# Patient Record
Sex: FEMALE | Race: WHITE | NOT HISPANIC OR LATINO | Employment: OTHER | URBAN - METROPOLITAN AREA
[De-identification: names, ages, dates, MRNs, and addresses within clinical notes are randomized per-mention and may not be internally consistent; named-entity substitution may affect disease eponyms.]

---

## 2017-01-12 ENCOUNTER — ALLSCRIPTS OFFICE VISIT (OUTPATIENT)
Dept: OTHER | Facility: OTHER | Age: 62
End: 2017-01-12

## 2017-01-12 DIAGNOSIS — M54.2 CERVICALGIA: ICD-10-CM

## 2017-01-12 DIAGNOSIS — I10 ESSENTIAL (PRIMARY) HYPERTENSION: ICD-10-CM

## 2017-01-12 DIAGNOSIS — M06.09 RHEUMATOID ARTHRITIS OF MULTIPLE SITES WITHOUT RHEUMATOID FACTOR (HCC): ICD-10-CM

## 2017-01-12 DIAGNOSIS — M47.812 SPONDYLOSIS OF CERVICAL REGION WITHOUT MYELOPATHY OR RADICULOPATHY: ICD-10-CM

## 2017-01-12 DIAGNOSIS — F41.9 ANXIETY DISORDER: ICD-10-CM

## 2017-01-12 DIAGNOSIS — Z51.81 ENCOUNTER FOR THERAPEUTIC DRUG LEVEL MONITORING: ICD-10-CM

## 2017-03-01 ENCOUNTER — TRANSCRIBE ORDERS (OUTPATIENT)
Dept: LAB | Facility: CLINIC | Age: 62
End: 2017-03-01

## 2017-03-01 ENCOUNTER — APPOINTMENT (OUTPATIENT)
Dept: LAB | Facility: CLINIC | Age: 62
End: 2017-03-01
Payer: MEDICARE

## 2017-03-01 DIAGNOSIS — I10 ESSENTIAL (PRIMARY) HYPERTENSION: ICD-10-CM

## 2017-03-01 DIAGNOSIS — R73.01 IMPAIRED FASTING GLUCOSE: Primary | ICD-10-CM

## 2017-03-01 DIAGNOSIS — Z51.81 ENCOUNTER FOR THERAPEUTIC DRUG LEVEL MONITORING: ICD-10-CM

## 2017-03-01 DIAGNOSIS — R73.01 IMPAIRED FASTING GLUCOSE: ICD-10-CM

## 2017-03-01 DIAGNOSIS — D69.6 THROMBOCYTOPENIA, UNSPECIFIED (HCC): ICD-10-CM

## 2017-03-01 DIAGNOSIS — M54.2 CERVICALGIA: ICD-10-CM

## 2017-03-01 DIAGNOSIS — M06.09 RHEUMATOID ARTHRITIS OF MULTIPLE SITES WITHOUT RHEUMATOID FACTOR (HCC): ICD-10-CM

## 2017-03-01 DIAGNOSIS — M47.812 SPONDYLOSIS OF CERVICAL REGION WITHOUT MYELOPATHY OR RADICULOPATHY: ICD-10-CM

## 2017-03-01 DIAGNOSIS — F41.9 ANXIETY DISORDER: ICD-10-CM

## 2017-03-01 LAB
ALBUMIN SERPL BCP-MCNC: 3.9 G/DL (ref 3.5–5)
ALP SERPL-CCNC: 74 U/L (ref 46–116)
ALT SERPL W P-5'-P-CCNC: 55 U/L (ref 12–78)
ANION GAP SERPL CALCULATED.3IONS-SCNC: 8 MMOL/L (ref 4–13)
AST SERPL W P-5'-P-CCNC: 36 U/L (ref 5–45)
BASOPHILS # BLD AUTO: 0.02 THOUSANDS/ΜL (ref 0–0.1)
BASOPHILS NFR BLD AUTO: 1 % (ref 0–1)
BILIRUB SERPL-MCNC: 0.7 MG/DL (ref 0.2–1)
BUN SERPL-MCNC: 10 MG/DL (ref 5–25)
CALCIUM SERPL-MCNC: 8.6 MG/DL (ref 8.3–10.1)
CHLORIDE SERPL-SCNC: 106 MMOL/L (ref 100–108)
CO2 SERPL-SCNC: 29 MMOL/L (ref 21–32)
CREAT SERPL-MCNC: 0.69 MG/DL (ref 0.6–1.3)
CRP SERPL QL: <3 MG/L
EOSINOPHIL # BLD AUTO: 0.06 THOUSAND/ΜL (ref 0–0.61)
EOSINOPHIL NFR BLD AUTO: 2 % (ref 0–6)
ERYTHROCYTE [DISTWIDTH] IN BLOOD BY AUTOMATED COUNT: 15.6 % (ref 11.6–15.1)
ERYTHROCYTE [SEDIMENTATION RATE] IN BLOOD: 2 MM/HOUR (ref 0–20)
EST. AVERAGE GLUCOSE BLD GHB EST-MCNC: 126 MG/DL
GFR SERPL CREATININE-BSD FRML MDRD: >60 ML/MIN/1.73SQ M
GLUCOSE SERPL-MCNC: 103 MG/DL (ref 65–140)
HBA1C MFR BLD: 6 % (ref 4.2–6.3)
HCT VFR BLD AUTO: 36.3 % (ref 34.8–46.1)
HGB BLD-MCNC: 11.8 G/DL (ref 11.5–15.4)
LYMPHOCYTES # BLD AUTO: 1.53 THOUSANDS/ΜL (ref 0.6–4.47)
LYMPHOCYTES NFR BLD AUTO: 39 % (ref 14–44)
MCH RBC QN AUTO: 21.1 PG (ref 26.8–34.3)
MCHC RBC AUTO-ENTMCNC: 32.5 G/DL (ref 31.4–37.4)
MCV RBC AUTO: 65 FL (ref 82–98)
MONOCYTES # BLD AUTO: 0.41 THOUSAND/ΜL (ref 0.17–1.22)
MONOCYTES NFR BLD AUTO: 11 % (ref 4–12)
NEUTROPHILS # BLD AUTO: 1.9 THOUSANDS/ΜL (ref 1.85–7.62)
NEUTS SEG NFR BLD AUTO: 47 % (ref 43–75)
PLATELET # BLD AUTO: 85 THOUSANDS/UL (ref 149–390)
POTASSIUM SERPL-SCNC: 3.3 MMOL/L (ref 3.5–5.3)
PROT SERPL-MCNC: 6.8 G/DL (ref 6.4–8.2)
RBC # BLD AUTO: 5.58 MILLION/UL (ref 3.81–5.12)
SODIUM SERPL-SCNC: 143 MMOL/L (ref 136–145)
WBC # BLD AUTO: 3.92 THOUSAND/UL (ref 4.31–10.16)

## 2017-03-01 PROCEDURE — 85025 COMPLETE CBC W/AUTO DIFF WBC: CPT

## 2017-03-01 PROCEDURE — 83036 HEMOGLOBIN GLYCOSYLATED A1C: CPT

## 2017-03-01 PROCEDURE — 85652 RBC SED RATE AUTOMATED: CPT

## 2017-03-01 PROCEDURE — 80053 COMPREHEN METABOLIC PANEL: CPT

## 2017-03-01 PROCEDURE — 86140 C-REACTIVE PROTEIN: CPT

## 2017-03-01 PROCEDURE — 36415 COLL VENOUS BLD VENIPUNCTURE: CPT

## 2017-03-02 ENCOUNTER — GENERIC CONVERSION - ENCOUNTER (OUTPATIENT)
Dept: OTHER | Facility: OTHER | Age: 62
End: 2017-03-02

## 2017-03-06 ENCOUNTER — ANESTHESIA EVENT (OUTPATIENT)
Dept: GASTROENTEROLOGY | Facility: HOSPITAL | Age: 62
End: 2017-03-06
Payer: MEDICARE

## 2017-03-06 ENCOUNTER — GENERIC CONVERSION - ENCOUNTER (OUTPATIENT)
Dept: OTHER | Facility: OTHER | Age: 62
End: 2017-03-06

## 2017-03-06 ENCOUNTER — ANESTHESIA (OUTPATIENT)
Dept: GASTROENTEROLOGY | Facility: HOSPITAL | Age: 62
End: 2017-03-06
Payer: MEDICARE

## 2017-03-06 ENCOUNTER — HOSPITAL ENCOUNTER (OUTPATIENT)
Facility: HOSPITAL | Age: 62
Setting detail: OUTPATIENT SURGERY
Discharge: HOME | End: 2017-03-06
Attending: INTERNAL MEDICINE | Admitting: INTERNAL MEDICINE
Payer: MEDICARE

## 2017-03-06 VITALS
WEIGHT: 154.32 LBS | HEIGHT: 61 IN | OXYGEN SATURATION: 98 % | HEART RATE: 54 BPM | DIASTOLIC BLOOD PRESSURE: 58 MMHG | BODY MASS INDEX: 29.14 KG/M2 | RESPIRATION RATE: 16 BRPM | TEMPERATURE: 97 F | SYSTOLIC BLOOD PRESSURE: 132 MMHG

## 2017-03-06 DIAGNOSIS — D12.6 TUBULOVILLOUS ADENOMA POLYP OF COLON: ICD-10-CM

## 2017-03-06 PROCEDURE — 88305 TISSUE EXAM BY PATHOLOGIST: CPT | Performed by: INTERNAL MEDICINE

## 2017-03-06 RX ORDER — SODIUM CHLORIDE, SODIUM LACTATE, POTASSIUM CHLORIDE, CALCIUM CHLORIDE 600; 310; 30; 20 MG/100ML; MG/100ML; MG/100ML; MG/100ML
125 INJECTION, SOLUTION INTRAVENOUS CONTINUOUS
Status: DISCONTINUED | OUTPATIENT
Start: 2017-03-06 | End: 2017-03-06 | Stop reason: HOSPADM

## 2017-03-06 RX ORDER — PROPOFOL 10 MG/ML
INJECTION, EMULSION INTRAVENOUS CONTINUOUS PRN
Status: DISCONTINUED | OUTPATIENT
Start: 2017-03-06 | End: 2017-03-06 | Stop reason: SURG

## 2017-03-06 RX ORDER — ONDANSETRON 2 MG/ML
4 INJECTION INTRAMUSCULAR; INTRAVENOUS ONCE
Status: DISCONTINUED | OUTPATIENT
Start: 2017-03-06 | End: 2017-03-06 | Stop reason: HOSPADM

## 2017-03-06 RX ORDER — PROPOFOL 10 MG/ML
INJECTION, EMULSION INTRAVENOUS AS NEEDED
Status: DISCONTINUED | OUTPATIENT
Start: 2017-03-06 | End: 2017-03-06 | Stop reason: SURG

## 2017-03-06 RX ADMIN — SODIUM CHLORIDE, SODIUM LACTATE, POTASSIUM CHLORIDE, AND CALCIUM CHLORIDE 125 ML/HR: .6; .31; .03; .02 INJECTION, SOLUTION INTRAVENOUS at 09:37

## 2017-03-06 RX ADMIN — PROPOFOL 150 MG: 10 INJECTION, EMULSION INTRAVENOUS at 10:07

## 2017-03-06 RX ADMIN — PROPOFOL 100 MCG/KG/MIN: 10 INJECTION, EMULSION INTRAVENOUS at 10:08

## 2017-03-14 ENCOUNTER — GENERIC CONVERSION - ENCOUNTER (OUTPATIENT)
Dept: OTHER | Facility: OTHER | Age: 62
End: 2017-03-14

## 2017-03-28 ENCOUNTER — GENERIC CONVERSION - ENCOUNTER (OUTPATIENT)
Dept: OTHER | Facility: OTHER | Age: 62
End: 2017-03-28

## 2017-05-03 ENCOUNTER — TRANSCRIBE ORDERS (OUTPATIENT)
Dept: ADMINISTRATIVE | Facility: HOSPITAL | Age: 62
End: 2017-05-03

## 2017-05-03 DIAGNOSIS — Z12.31 VISIT FOR SCREENING MAMMOGRAM: Primary | ICD-10-CM

## 2017-05-18 ENCOUNTER — APPOINTMENT (OUTPATIENT)
Dept: LAB | Facility: CLINIC | Age: 62
End: 2017-05-18
Payer: MEDICARE

## 2017-05-18 ENCOUNTER — ALLSCRIPTS OFFICE VISIT (OUTPATIENT)
Dept: OTHER | Facility: OTHER | Age: 62
End: 2017-05-18

## 2017-05-18 DIAGNOSIS — M06.09 RHEUMATOID ARTHRITIS OF MULTIPLE SITES WITHOUT RHEUMATOID FACTOR (HCC): ICD-10-CM

## 2017-05-18 LAB
ALBUMIN SERPL BCP-MCNC: 3.7 G/DL (ref 3.5–5)
ALP SERPL-CCNC: 81 U/L (ref 46–116)
ALT SERPL W P-5'-P-CCNC: 57 U/L (ref 12–78)
ANION GAP SERPL CALCULATED.3IONS-SCNC: 6 MMOL/L (ref 4–13)
AST SERPL W P-5'-P-CCNC: 32 U/L (ref 5–45)
BASOPHILS # BLD AUTO: 0.01 THOUSANDS/ΜL (ref 0–0.1)
BASOPHILS NFR BLD AUTO: 0 % (ref 0–1)
BILIRUB SERPL-MCNC: 0.7 MG/DL (ref 0.2–1)
BUN SERPL-MCNC: 10 MG/DL (ref 5–25)
CALCIUM SERPL-MCNC: 8.9 MG/DL (ref 8.3–10.1)
CHLORIDE SERPL-SCNC: 106 MMOL/L (ref 100–108)
CO2 SERPL-SCNC: 30 MMOL/L (ref 21–32)
CREAT SERPL-MCNC: 0.72 MG/DL (ref 0.6–1.3)
CRP SERPL QL: <3 MG/L
EOSINOPHIL # BLD AUTO: 0.05 THOUSAND/ΜL (ref 0–0.61)
EOSINOPHIL NFR BLD AUTO: 1 % (ref 0–6)
ERYTHROCYTE [DISTWIDTH] IN BLOOD BY AUTOMATED COUNT: 16.6 % (ref 11.6–15.1)
ERYTHROCYTE [SEDIMENTATION RATE] IN BLOOD: 3 MM/HOUR (ref 0–20)
GFR SERPL CREATININE-BSD FRML MDRD: >60 ML/MIN/1.73SQ M
GLUCOSE SERPL-MCNC: 123 MG/DL (ref 65–140)
HCT VFR BLD AUTO: 38 % (ref 34.8–46.1)
HGB BLD-MCNC: 12.4 G/DL (ref 11.5–15.4)
LYMPHOCYTES # BLD AUTO: 1.5 THOUSANDS/ΜL (ref 0.6–4.47)
LYMPHOCYTES NFR BLD AUTO: 26 % (ref 14–44)
MCH RBC QN AUTO: 21.1 PG (ref 26.8–34.3)
MCHC RBC AUTO-ENTMCNC: 32.6 G/DL (ref 31.4–37.4)
MCV RBC AUTO: 65 FL (ref 82–98)
MONOCYTES # BLD AUTO: 0.6 THOUSAND/ΜL (ref 0.17–1.22)
MONOCYTES NFR BLD AUTO: 11 % (ref 4–12)
NEUTROPHILS # BLD AUTO: 3.55 THOUSANDS/ΜL (ref 1.85–7.62)
NEUTS SEG NFR BLD AUTO: 62 % (ref 43–75)
PLATELET # BLD AUTO: 117 THOUSANDS/UL (ref 149–390)
POTASSIUM SERPL-SCNC: 3.7 MMOL/L (ref 3.5–5.3)
PROT SERPL-MCNC: 6.8 G/DL (ref 6.4–8.2)
RBC # BLD AUTO: 5.87 MILLION/UL (ref 3.81–5.12)
SODIUM SERPL-SCNC: 142 MMOL/L (ref 136–145)
WBC # BLD AUTO: 5.71 THOUSAND/UL (ref 4.31–10.16)

## 2017-05-18 PROCEDURE — 85025 COMPLETE CBC W/AUTO DIFF WBC: CPT

## 2017-05-18 PROCEDURE — 80053 COMPREHEN METABOLIC PANEL: CPT

## 2017-05-18 PROCEDURE — 36415 COLL VENOUS BLD VENIPUNCTURE: CPT

## 2017-05-18 PROCEDURE — 86140 C-REACTIVE PROTEIN: CPT

## 2017-05-18 PROCEDURE — 85652 RBC SED RATE AUTOMATED: CPT

## 2017-05-26 ENCOUNTER — HOSPITAL ENCOUNTER (OUTPATIENT)
Dept: MAMMOGRAPHY | Facility: HOSPITAL | Age: 62
Discharge: HOME | End: 2017-05-26
Payer: MEDICARE

## 2017-05-26 DIAGNOSIS — Z12.31 VISIT FOR SCREENING MAMMOGRAM: ICD-10-CM

## 2017-05-26 PROCEDURE — G0202 SCR MAMMO BI INCL CAD: HCPCS

## 2017-07-19 ENCOUNTER — APPOINTMENT (OUTPATIENT)
Dept: LAB | Facility: CLINIC | Age: 62
End: 2017-07-19
Payer: MEDICARE

## 2017-07-19 ENCOUNTER — TRANSCRIBE ORDERS (OUTPATIENT)
Dept: LAB | Facility: CLINIC | Age: 62
End: 2017-07-19

## 2017-07-19 ENCOUNTER — ALLSCRIPTS OFFICE VISIT (OUTPATIENT)
Dept: OTHER | Facility: OTHER | Age: 62
End: 2017-07-19

## 2017-07-19 DIAGNOSIS — M06.09 RHEUMATOID ARTHRITIS OF MULTIPLE SITES WITHOUT RHEUMATOID FACTOR (HCC): ICD-10-CM

## 2017-07-19 PROCEDURE — 86480 TB TEST CELL IMMUN MEASURE: CPT

## 2017-07-19 PROCEDURE — 36415 COLL VENOUS BLD VENIPUNCTURE: CPT

## 2017-07-22 LAB
ANNOTATION COMMENT IMP: NORMAL
GAMMA INTERFERON BACKGROUND BLD IA-ACNC: 0.05 IU/ML
M TB IFN-G BLD-IMP: NEGATIVE
M TB IFN-G CD4+ BCKGRND COR BLD-ACNC: 0.02 IU/ML
M TB IFN-G CD4+ T-CELLS BLD-ACNC: 0.07 IU/ML
MITOGEN IGNF BLD-ACNC: 5.34 IU/ML
QUANTIFERON-TB GOLD IN TUBE: NORMAL
SERVICE CMNT-IMP: NORMAL

## 2017-09-05 DIAGNOSIS — M06.09 RHEUMATOID ARTHRITIS OF MULTIPLE SITES WITHOUT RHEUMATOID FACTOR (HCC): ICD-10-CM

## 2017-09-07 ENCOUNTER — APPOINTMENT (OUTPATIENT)
Dept: LAB | Facility: CLINIC | Age: 62
End: 2017-09-07
Payer: MEDICARE

## 2017-09-07 DIAGNOSIS — M06.09 RHEUMATOID ARTHRITIS OF MULTIPLE SITES WITHOUT RHEUMATOID FACTOR (HCC): ICD-10-CM

## 2017-09-07 LAB
ALBUMIN SERPL BCP-MCNC: 3.7 G/DL (ref 3.5–5)
ALP SERPL-CCNC: 68 U/L (ref 46–116)
ALT SERPL W P-5'-P-CCNC: 52 U/L (ref 12–78)
ANION GAP SERPL CALCULATED.3IONS-SCNC: 5 MMOL/L (ref 4–13)
AST SERPL W P-5'-P-CCNC: 34 U/L (ref 5–45)
BASOPHILS # BLD AUTO: 0.01 THOUSANDS/ΜL (ref 0–0.1)
BASOPHILS NFR BLD AUTO: 0 % (ref 0–1)
BILIRUB SERPL-MCNC: 0.8 MG/DL (ref 0.2–1)
BUN SERPL-MCNC: 20 MG/DL (ref 5–25)
CALCIUM SERPL-MCNC: 8.8 MG/DL (ref 8.3–10.1)
CHLORIDE SERPL-SCNC: 106 MMOL/L (ref 100–108)
CO2 SERPL-SCNC: 32 MMOL/L (ref 21–32)
CREAT SERPL-MCNC: 0.8 MG/DL (ref 0.6–1.3)
CRP SERPL QL: <3 MG/L
EOSINOPHIL # BLD AUTO: 0.06 THOUSAND/ΜL (ref 0–0.61)
EOSINOPHIL NFR BLD AUTO: 1 % (ref 0–6)
ERYTHROCYTE [DISTWIDTH] IN BLOOD BY AUTOMATED COUNT: 16.5 % (ref 11.6–15.1)
ERYTHROCYTE [SEDIMENTATION RATE] IN BLOOD: 3 MM/HOUR (ref 0–20)
GFR SERPL CREATININE-BSD FRML MDRD: 80 ML/MIN/1.73SQ M
GLUCOSE SERPL-MCNC: 67 MG/DL (ref 65–140)
HCT VFR BLD AUTO: 35.1 % (ref 34.8–46.1)
HGB BLD-MCNC: 11.5 G/DL (ref 11.5–15.4)
LYMPHOCYTES # BLD AUTO: 1.64 THOUSANDS/ΜL (ref 0.6–4.47)
LYMPHOCYTES NFR BLD AUTO: 26 % (ref 14–44)
MCH RBC QN AUTO: 20.8 PG (ref 26.8–34.3)
MCHC RBC AUTO-ENTMCNC: 32.8 G/DL (ref 31.4–37.4)
MCV RBC AUTO: 64 FL (ref 82–98)
MONOCYTES # BLD AUTO: 0.59 THOUSAND/ΜL (ref 0.17–1.22)
MONOCYTES NFR BLD AUTO: 9 % (ref 4–12)
NEUTROPHILS # BLD AUTO: 4.02 THOUSANDS/ΜL (ref 1.85–7.62)
NEUTS SEG NFR BLD AUTO: 64 % (ref 43–75)
PLATELET # BLD AUTO: 114 THOUSANDS/UL (ref 149–390)
POTASSIUM SERPL-SCNC: 3.9 MMOL/L (ref 3.5–5.3)
PROT SERPL-MCNC: 6.8 G/DL (ref 6.4–8.2)
RBC # BLD AUTO: 5.52 MILLION/UL (ref 3.81–5.12)
SODIUM SERPL-SCNC: 143 MMOL/L (ref 136–145)
WBC # BLD AUTO: 6.32 THOUSAND/UL (ref 4.31–10.16)

## 2017-09-07 PROCEDURE — 85025 COMPLETE CBC W/AUTO DIFF WBC: CPT

## 2017-09-07 PROCEDURE — 36415 COLL VENOUS BLD VENIPUNCTURE: CPT

## 2017-09-07 PROCEDURE — 80053 COMPREHEN METABOLIC PANEL: CPT

## 2017-09-07 PROCEDURE — 86140 C-REACTIVE PROTEIN: CPT

## 2017-09-07 PROCEDURE — 85652 RBC SED RATE AUTOMATED: CPT

## 2017-09-19 ENCOUNTER — ALLSCRIPTS OFFICE VISIT (OUTPATIENT)
Dept: OTHER | Facility: OTHER | Age: 62
End: 2017-09-19

## 2017-11-22 ENCOUNTER — TRANSCRIBE ORDERS (OUTPATIENT)
Dept: LAB | Facility: CLINIC | Age: 62
End: 2017-11-22

## 2017-11-22 ENCOUNTER — APPOINTMENT (OUTPATIENT)
Dept: LAB | Facility: CLINIC | Age: 62
End: 2017-11-22
Payer: MEDICARE

## 2017-11-22 DIAGNOSIS — Z79.899 OTHER LONG TERM (CURRENT) DRUG THERAPY: ICD-10-CM

## 2017-11-22 DIAGNOSIS — M47.812 SPONDYLOSIS OF CERVICAL REGION WITHOUT MYELOPATHY OR RADICULOPATHY: ICD-10-CM

## 2017-11-22 DIAGNOSIS — M06.09 RHEUMATOID ARTHRITIS OF MULTIPLE SITES WITHOUT RHEUMATOID FACTOR (HCC): ICD-10-CM

## 2017-11-22 LAB
ALBUMIN SERPL BCP-MCNC: 3.5 G/DL (ref 3.5–5)
ALP SERPL-CCNC: 82 U/L (ref 46–116)
ALT SERPL W P-5'-P-CCNC: 37 U/L (ref 12–78)
ANION GAP SERPL CALCULATED.3IONS-SCNC: 9 MMOL/L (ref 4–13)
AST SERPL W P-5'-P-CCNC: 27 U/L (ref 5–45)
BASOPHILS # BLD AUTO: 0.01 THOUSANDS/ΜL (ref 0–0.1)
BASOPHILS NFR BLD AUTO: 0 % (ref 0–1)
BILIRUB SERPL-MCNC: 0.7 MG/DL (ref 0.2–1)
BUN SERPL-MCNC: 13 MG/DL (ref 5–25)
CALCIUM SERPL-MCNC: 8.7 MG/DL (ref 8.3–10.1)
CHLORIDE SERPL-SCNC: 106 MMOL/L (ref 100–108)
CO2 SERPL-SCNC: 28 MMOL/L (ref 21–32)
CREAT SERPL-MCNC: 0.88 MG/DL (ref 0.6–1.3)
CRP SERPL QL: <3 MG/L
EOSINOPHIL # BLD AUTO: 0.07 THOUSAND/ΜL (ref 0–0.61)
EOSINOPHIL NFR BLD AUTO: 1 % (ref 0–6)
ERYTHROCYTE [DISTWIDTH] IN BLOOD BY AUTOMATED COUNT: 16 % (ref 11.6–15.1)
ERYTHROCYTE [SEDIMENTATION RATE] IN BLOOD: 6 MM/HOUR (ref 0–20)
GFR SERPL CREATININE-BSD FRML MDRD: 71 ML/MIN/1.73SQ M
GLUCOSE SERPL-MCNC: 176 MG/DL (ref 65–140)
HCT VFR BLD AUTO: 35.4 % (ref 34.8–46.1)
HGB BLD-MCNC: 11.3 G/DL (ref 11.5–15.4)
LYMPHOCYTES # BLD AUTO: 1.22 THOUSANDS/ΜL (ref 0.6–4.47)
LYMPHOCYTES NFR BLD AUTO: 23 % (ref 14–44)
MCH RBC QN AUTO: 20.7 PG (ref 26.8–34.3)
MCHC RBC AUTO-ENTMCNC: 31.9 G/DL (ref 31.4–37.4)
MCV RBC AUTO: 65 FL (ref 82–98)
MONOCYTES # BLD AUTO: 0.4 THOUSAND/ΜL (ref 0.17–1.22)
MONOCYTES NFR BLD AUTO: 8 % (ref 4–12)
NEUTROPHILS # BLD AUTO: 3.51 THOUSANDS/ΜL (ref 1.85–7.62)
NEUTS SEG NFR BLD AUTO: 68 % (ref 43–75)
PLATELET # BLD AUTO: 151 THOUSANDS/UL (ref 149–390)
POTASSIUM SERPL-SCNC: 3.8 MMOL/L (ref 3.5–5.3)
PROT SERPL-MCNC: 6.7 G/DL (ref 6.4–8.2)
RBC # BLD AUTO: 5.46 MILLION/UL (ref 3.81–5.12)
SODIUM SERPL-SCNC: 143 MMOL/L (ref 136–145)
WBC # BLD AUTO: 5.21 THOUSAND/UL (ref 4.31–10.16)

## 2017-11-22 PROCEDURE — 85652 RBC SED RATE AUTOMATED: CPT

## 2017-11-22 PROCEDURE — 80053 COMPREHEN METABOLIC PANEL: CPT

## 2017-11-22 PROCEDURE — 85025 COMPLETE CBC W/AUTO DIFF WBC: CPT

## 2017-11-22 PROCEDURE — 36415 COLL VENOUS BLD VENIPUNCTURE: CPT

## 2017-11-22 PROCEDURE — 86140 C-REACTIVE PROTEIN: CPT

## 2017-12-05 DIAGNOSIS — M47.812 SPONDYLOSIS OF CERVICAL REGION WITHOUT MYELOPATHY OR RADICULOPATHY: ICD-10-CM

## 2017-12-05 DIAGNOSIS — Z79.899 OTHER LONG TERM (CURRENT) DRUG THERAPY: ICD-10-CM

## 2017-12-05 DIAGNOSIS — M06.09 RHEUMATOID ARTHRITIS OF MULTIPLE SITES WITHOUT RHEUMATOID FACTOR (HCC): ICD-10-CM

## 2017-12-19 ENCOUNTER — ALLSCRIPTS OFFICE VISIT (OUTPATIENT)
Dept: OTHER | Facility: OTHER | Age: 62
End: 2017-12-19

## 2018-01-10 NOTE — PROGRESS NOTES
Assessment    1  Rheumatoid arthritis of multiple sites with negative rheumatoid factor (714 0) (M06 09)   2  Neck pain (723 1) (M54 2)   3  Spondylosis of cervical region without myelopathy or radiculopathy (721 0) (M47 812)   4  History of Transaminitis (790 4) (R74 0)   5  Thrombocytopenia (287 5) (D69 6)   6  Encounter for monitoring rituximab therapy (V58 83,V58 69) (Z51 81,Z79 899)   7  Hypertension (401 9) (I10)   8  Hyperlipidemia (272 4) (E78 5)   9  Diabetes mellitus (250 00) (E11 9)    Plan    1  (1) CBC/PLT/DIFF; Status:Active; Requested for:57Auq3264;    2  (1) COMPREHENSIVE METABOLIC PANEL; Status:Active; Requested for:14Cdo6088;    3  (1) C-REACTIVE PROTEIN; Status:Active; Requested for:71Djf5004;    4  (1) SED RATE; Status:Active; Requested for:87Sbi8140;    5  Follow-up visit in 3 months Evaluation and Treatment  Follow-up  Status: Complete    Done: 37Iuu1601    6  TiZANidine HCl - 4 MG Oral Tablet; TAKE 1/2 TO 1 TABLET 3 TIMES DAILY AS   NEEDED    7  Call [phone] if: The pain seems worse ; Status:Complete;   Done: 34COA2625   8  Call [phone] if: The symptoms seem worse ; Status:Complete;   Done:   84ADS5787   9  Call [phone] if: You have questions or concerns about your problem ;   Status:Complete;   Done: 50QFO0856    Discussion/Summary    Ms Mtz continues to have constant neck pain, which is relatively unchanged since her last evaluation  She is also had some intermittent flares of her rheumatoid arthritis in multiple joints, which she describes as aching and occasional swelling, which she is only noted in her ankles  She does feel that her symptoms have worsened over the last month, as she is approaching her next dose of the Rituxan  She has had some coldness in her feet, and was evaluated for neuropathy, which was ruled out  She denies any other obvious joint swelling  She does occasionally take muscle relaxers for her flares, which seems to help   She does report some mild morning stiffness which typically lasts several minutes in duration  She does report difficulty sleeping at night, but this is not due to pain  She also reports nonrestorative sleep, as well as occasional fatigue  On exam, there is mild synovitis of the bilateral ankles  There is no other active synovitis  She does have paraspinal spasm noted in the cervical spine  No recent rheumatologic labs were available for review today  At this time, her rheumatoid arthritis appears mildly active, which is likely due to her being due for Rituxan dose  I would like to check an updated CBC, CMP, ESR, and CRP before her infusion, and we will obtain an updated CMP after receiving the infusions to ensure that she does not develop transaminitis  I will not make any other changes to her medication regimen at this time, as she does feel the Rituxan has been holding her symptoms quite well  I will reevaluate her in 3 months  She will call in interim if there are any questions or concerns  Counseling  Rheumatology Counseling Documentation: The patient was counseled regarding diagnostic results, instructions for management and impressions  Chief Complaint  F/U RA   Patient is here today for follow up of chronic conditions described in HPI  History of Present Illness  Pt  returns for F/U for RA  Having significant weight gain since having cholecystectomy  Still with neck pain which is constant  c/o intermittent flares of RA in multiple joints -> c/o aching in joints and occasional swelling in ankles  c/o coldness in feet -> neuropathy r/o'ed  No color changes in feet  Due for Rituxan on 4/22  Occasionally takes muscle relaxers for flares which help  No other obvious joint swelling  + mild AM stiffness x several minutes  + difficulty sleeping -> not due to pain  + non-restorative sleep  + occasional fatigue       RAPID3: 13 7/30      Review of Systems    Constitutional: recent 3 lb weight loss, but no fever, no recent weight gain, no chills and no anorexia    The patient presents with complaints of occasional episodes of fatigue  HEENT: dryness of the eyes and erythema eye(s), but no blurred vision, no amaurosis fugax, no eye pain, no dryness mouth, no mouth sores, not feeling congested and no sore throat  Cardiovascular: no chest pain or pressure, no dyspnea on exertion and no swelling in the arms or legs  Respiratory: no unusual or persistent cough, no shortness of breath and no pleurisy  Gastrointestinal: + bloating, but no abdominal pain, no nausea, no vomiting, no heartburn, no diarrhea, no constipation, no melena and no BRBPR  Genitourinary: no foamy urine, but no dysuria and no hematuria  Musculoskeletal: as noted in HPI  Integumentary alopecia and photosensitivity, but no rash, no Raynaud's and no nail changes  Endocrine no polyuria and no polydipsia  Hematologic/Lymphatic: no unusual bleeding and no tendency for easy bruising  Neurological: headache and tingling, but no vertigo or dizziness and no weakness  Psychiatric: insomnia and non-restorative sleep  Active Problems    1  Anxiety (300 00) (F41 9)   2  Arthropathy of cervical spine (716 98) (M46 92)   3  Cervical radicular pain (723 4) (M54 12)   4  Colon cancer screening (V76 51) (Z12 11)   5  Depression (311) (F32 9)   6  Diabetes mellitus (250 00) (E11 9)   7  Drug-induced thrombocytopenia (287 49,E980 5) (D69 59)   8  Encounter for monitoring rituximab therapy (V58 83,V58 69) (Z51 81,Z79 899)   9  Glaucoma (365 9) (H40 9)   10  Hepatitis B antibody positive (795 79) (R76 8)   11  History of rheumatoid arthritis (V13 4) (Z87 39)   12  Hyperlipidemia (272 4) (E78 5)   13  Hypertension (401 9) (I10)   14  Lung nodules (793 19) (R91 8)   15  Mediterranean anemia (282 40) (D56 9)   16  Myofascial pain (729 1) (M79 1)   17  Neck pain (723 1) (M54 2)   18   Rheumatoid arthritis of multiple sites with negative rheumatoid factor (714 0) (M06 09)   19  Skin cancer, basal cell (173 91) (C44 91)   20  Spondylosis of cervical region without myelopathy or radiculopathy (721 0) (M47 812)   21  Thrombocytopenia (287 5) (D69 6)    Past Medical History    1  Anxiety (300 00) (F41 9)   2  Depression (311) (F32 9)   3  Glaucoma (365 9) (H40 9)   4  History of hepatitis B virus infection (V12 09) (Z86 19)   5  History of rheumatoid arthritis (V13 4) (Z87 39)   6  Hyperlipidemia (272 4) (E78 5)   7  Hypertension (401 9) (I10)   8  Lung nodules (793 19) (R91 8)   9  Mediterranean anemia (282 40) (D56 9)   10  History of On abatacept therapy (V58 69) (Z79 899)   11  History of Seronegative rheumatoid arthritis (714 0) (M06 00)   12  Thrombocytopenia (287 5) (D69 6)   13  History of Transaminitis (790 4) (R74 0)    The active problems and past medical history were reviewed and updated today  Surgical History    1  History of Cholecystectomy Laparoscopic   2  History of Foot Surgery   3  History of Hemorrhoidectomy   4  History of Tubal Ligation    The surgical history was reviewed and updated today  Family History    1  Denied: Family history of Colon cancer   2  Denied: Family history of Crohn's disease without complication, unspecified   gastrointestinal tract location   3  Denied: Family history of liver disease   4  Family history of malignant neoplasm of breast (V16 3) (Z80 3)   5  Family history of osteoporosis (V17 81) (Z82 62)    6  Family history of CAD (coronary artery disease)   7  Denied: Family history of Colon cancer   8  Denied: Family history of Crohn's disease without complication, unspecified   gastrointestinal tract location   9  Family history of diabetes mellitus (V18 0) (Z83 3)   10  Denied: Family history of liver disease    11  Denied: Family history of Crohn's disease   12  Family history of malignant neoplasm of breast (V16 3) (Z80 3)   13  Denied: Family history of psoriasis   14   Denied: Family history of rheumatoid arthritis   15  Denied: Family history of systemic lupus erythematosus   16  Denied: Family history of ulcerative colitis   17  Family history of Hypertension, benign    The family history was reviewed and updated today  Social History    · Former smoker (Z87 93) (V29 467)   · No alcohol use   · No drug use  The social history was reviewed and updated today  The social history was reviewed and is unchanged  Current Meds   1  Allegra 180 MG TABS; TAKE 1 TABLET DAILY; Therapy: (Recorded:22May2015) to Recorded   2  Atorvastatin Calcium 20 MG Oral Tablet; TAKE 1 TABLET DAILY AT BEDTIME; Therapy: 16Apr2015 to (Evaluate:39Sui6563) Recorded   3  Folic Acid 1 MG Oral Tablet; TAKE 1 TABLET DAILY; Therapy: (Recorded:22May2015) to Recorded   4  Lexapro 20 MG Oral Tablet; TAKE 1 TABLET DAILY; Therapy: 04Apr2015 to (Evaluate:21Jun2015) Recorded   5  Lumigan 0 01 % Ophthalmic Solution; INSTILL 1 DROP INTO BOTH EYES ONCE DAILY   IN THE EVENING; Therapy: 98CFO9935 to (Evaluate:20Aug2015) Recorded   6  MoviPrep 100 GM Oral Solution Reconstituted; USE AS DIRECTED; Therapy: 26MTF7209 to (Last Rx:14Ljq3605)  Requested for: 75IOU1579 Ordered   7  Rituxan 10 MG/ML CONC; 1000mg IV on day 0 and 14 at [facility] center; Therapy: 17Ggq3662 to (Evaluate:[address]); Last Rx:05Kgj7433 Ordered   8  TiZANidine HCl - 4 MG Oral Tablet; TAKE 1/2 TO 1 TABLET 3 TIMES DAILY AS NEEDED; Therapy: (Recorded:11Apr2016) to Recorded   9  Vitamin B12 TABS; TAKE 1 TABLET DAILY AS DIRECTED; Therapy: (Recorded:22May2015) to Recorded   10  Vitamin C CAPS; TAKE 1 CAPSULE DAILY; Therapy: (Recorded:22May2015) to Recorded   11  Vitamin D3 CAPS; take 1 capsule daily; Therapy: (Recorded:22May2015) to Recorded    The medication list was reviewed and updated today  Allergies    1   No Known Drug Allergies    Vitals  Signs [Data Includes: Current Encounter]   Recorded: 11Apr2016 01:57PM   Heart Rate: 64  Systolic: 591  Diastolic: 84  Weight: 153 lb   BMI Calculated: 28 91  BSA Calculated: 1 69    Physical Exam    Constitutional   General appearance: No acute distress, well appearing and well nourished  Eyes   Conjunctiva and lids: No swelling, erythema or discharge  Pupils and irises: Equal, round and reactive to light  Ears, Nose, Mouth, and Throat   External inspection of ears and nose: Normal     Oropharynx: Abnormal   (+ upper denture) Oral mucosa was dry  Pulmonary   Respiratory effort: No increased work of breathing or signs of respiratory distress  Auscultation of lungs: Clear to auscultation  Cardiovascular   Auscultation of heart: Normal rate and rhythm, normal S1 and S2, without murmurs  Examination of extremities for edema and/or varicosities: Normal     Lymphatic   Palpation of lymph nodes in neck: No lymphadenopathy  Psychiatric   Orientation to person, place, and time: Normal     Mood and affect: Normal         Right ankle tenderness and swelling  Left ankle tenderness and swelling  Musculoskeletal - Joints, bones, and muscles: Abnormal  Palpation - bilateral knee crepitus  Skin - Skin and subcutaneous tissue: Normal    Neurologic - Sensation: Normal    Additional Findings - Decreased ROM C-spine  Results/Data  Results   (1) CBC/PLT/DIFF [id number] 02:16PM [facility]     Test Name Result Flag Reference   DIFFERENTIAL METHOD Automated     WBC COUNT 5 33 Thousand/uL  4 31-10 16   RBC COUNT 5 63 Million/uL H 3 81-5 12   HEMOGLOBIN 11 8 g/dL  11 5-15 4   HEMATOCRIT 35 8 %  34 8-46  1   MCV 64 fL L 82-98   MCH 21 0 pg L 26 8-34 3   MCHC 33 0 g/dL  31 4-37 4   RDW 15 8 % H 11 6-15 1   PLATELET COUNT 921 Thousand/uL L 149-390   Manual Review of Smear Performed   NEUTROPHILS RELATIVE PERCENT 68 %  43-75   LYMPHOCYTES RELATIVE PERCENT 21 %  14-44   MONOCYTES RELATIVE PERCENT 10 %  4-12   EOSINOPHILS RELATIVE PERCENT 1 %  0-6   NEUTROPHILS ABSOLUTE COUNT 3 62 Thousand/uL  1 85-7 62   LYMPHOCYTES ABSOLUTE COUNT 1 12 Thousand/uL  0 60-4 47   MONOCYTES ABSOLUTE COUNT 0 53 Thousand/uL  0 17-1 22   EOSINOPHILS ABSOLUTE COUNT 0 05 Thousand/uL  0 00-0 61   BASOPHILS ABSOLUTE COUNT 0 02 Thousand/uL  0 00-0 10   PLT ESTIMATE BORDERLINE     Large Platelets PRESENT       (1) COMPREHENSIVE METABOLIC PANEL [id number] 65:31HA [employer]   Has the patient been fasting for 10-12 hours? -     Test Name Result Flag Reference   SODIUM 140 mmol/L  136-145   POTASSIUM 3 4 mmol/L L 3 5-5 3   CHLORIDE 102 mmol/L     CARBON DIOXIDE 31 5 mmol/L  21 0-32 0   ANION GAP (CALC) 7 mmol/L  4-13   BILI, TOTAL 0 59 mg/dL  0 20-1 00   TOTAL PROTEIN 6 8 g/dL  6 4-8 2   ALK PHOSPHATAS 63 U/L     ALT (SGPT) 37 U/L  12-78   AST(SGOT) 24 U/L  5-45   GLUCOSE,RANDM 196 mg/dL H    If patient is fasting, the ADA then defines impaired fasting glucose as  >100 mg/dl and diabetes as  >or equal to 126 mg/dl  ALBUMIN 3 7 g/dL  3 5-5 0   BLOOD UREA NITROGEN 11 mg/dL  5-25   CALCIUM 8 7 mg/dL  8 3-10 1   CREATININE 0 69 mg/dL  0 60-1 30   Standardized to IDMS reference method   eGFR African American >60 ml/min/1 73sq m     [facility] [employer] Disease Education Program recommendations are as  follows:  GFR calculation is accurate only with a steady state creatinine  Chronic Kidney disease less than 60 ml/min/1 73 sq  meters  Kidney failure less than 15 ml/min/1 73 sq  meters     eGFR Non-African American >60 ml/min/1 73sq m       (1) C-REACTIVE PROTEIN [id number] 02:16PM [employer]     Test Name Result Flag Reference   C-REACT PROTEIN <0 9 mg/L  0 0-2 9     (1) SED RATE [id number] 02:16PM [employer]     Test Name Result Flag Reference   SED RATE 2 mm/hour  0-20       Future Appointments    Date/Time Provider Specialty Site   04/12/2016 12:00 PM [place] Jadon, [address] Obstetrics/Gynecology [facility]   07/11/2016 12:40 PM Elvia Mckinney, DO [facility] [address] [facility]   Electronically signed by : Jen Snyder DO;  Apr 11 2016 10:02PM EST                       (Author)

## 2018-01-10 NOTE — PROGRESS NOTES
Assessment    1  Rheumatoid arthritis of multiple sites with negative rheumatoid factor (714 0) (M06 09)   2  Encounter for monitoring rituximab therapy (V58 83,V58 69) (Z51 81,Z79 899)   3  Spondylosis of cervical region without myelopathy or radiculopathy (721 0) (M47 812)   4  Neck pain (723 1) (M54 2)   5  Hypertension (401 9) (I10)   6  Anxiety (300 00) (F41 9)    Plan    1  (1) CBC/PLT/DIFF; Status:Active; Requested for:47Dyn5573;    2  (1) COMPREHENSIVE METABOLIC PANEL; Status:Active; Requested for:41Lyk1563;    3  (1) C-REACTIVE PROTEIN; Status:Active; Requested for:87Jsm3810;    4  (1) QUANTIFERON - TB GOLD; Status:Active; Requested for:52Qai2134;    5  Call [phone] if: The pain seems worse ; Status:Complete;   Done: 86VTY2860   6  Call [phone] if: The symptoms seem worse ; Status:Complete;   Done:   88TER5242   7  Call [phone] if: You have questions or concerns about your problem ;   Status:Complete;   Done: 78OMW3233   8  Follow-up visit in 3 months Evaluation and Treatment  Follow-up  Status: Complete    Done: 88XXD8337    9  Humira Pen 40 MG/0 8ML Subcutaneous Pen-injector Kit; Inject 40 mg SC every   other week   10  (1) SED RATE; Status:Active; Requested for:84Dok2684;     11  Rituxan 10 MG/ML CONC    Discussion/Summary    This is a 81-year-old female presenting today for follow-up for rheumatoid arthritis  Patient states that she's not been feeling well since last appointment  She has noticed increased pain primarily in her hands, ankles, neck, and low back  She was seen by pain management sent for physical therapy however was too expensive  She does continue to complete exercises at home without much relief  She is currently receiving Rituxan infusions however she states that she does have frequent flares and does not like the way this medication makes her feel  She states that she did have a flare some weeks back which resulted in swelling and pain in the left ankle   The patient states that at this time she has not noticed any other obvious joint swelling however continues to have difficulty sleeping  She also describes morning stiffness lasting about 10 minutes  On physical exam, patient has mild synovitis of the left ankle and the second and fourth MCPs of the left hand  She also is tender in all these areas and in addition does have tenderness of the PIP of the fifth digit of the right hand as well as the left greater trochanteric bursa and bilateral ankle tenderness  Patient's labs revealed a CMP with a slightly low potassium and a slightly elevated carbon dioxide  At this time patient's history and physical is most consistent with rheumatoid arthritis which appears to be mildly active at this time  We will plan to discontinue Rituxan and start patient on Humira subcutaneous injections every other week  We will plan to obtain a Quantiferon TB Gold prior to starting Humira  We will also repeat a CBC, CMP, CRP, and ESR before patient's next follow-up in 3 months time  The patient has any questions or concerns or any increased pain before her next appointment she will call the office  In addition patient did request that her last pain management office visit note as well as x-ray the cervical spine be faxed to [facility] pain management  Patient was however urged to call [facility] management for further evaluation of her current neck and low back pain  The patient was counseled regarding instructions for management, prognosis, patient and family education, risks and benefits of treatment options, importance of compliance with treatment  Chief Complaint  F/U RA   Patient is here today for follow up of chronic conditions described in HPI  History of Present Illness  Patient is in the office today for follow up for RA  Patient is not feeling not well since last appt  Increased neck pain  Saw pain management and was sent to PT but too expensive   Continues to do exercises but no relief  Neck, low back pain, hand, and ankle pain  Rituxan was last given in April  Doesn't like the infusion and having flare ups of RA  Some ankle swelling, primarily on the left  no other obvious joint swelling  +Difficulty sleeping  No non restorative sleep  +AM stiffness x 10 minutes  No fatigue  RAPID: 15 2 /30      Review of Systems    Constitutional: fatigue and anorexia, but no fever, no recent weight gain, no chills and no recent weight loss  HEENT: dryness of the eyes and dryness mouth, but no blurred vision, no double vision, no amaurosis fugax, no eye pain, no erythema eye(s), no mouth sores, not feeling congested and no sore throat  Cardiovascular: swelling in the arms or legs, but no chest pain or pressure and no dyspnea on exertion  Respiratory: no unusual or persistent cough, no shortness of breath and no pleurisy  Gastrointestinal: no abdominal pain, no nausea, no vomiting, no heartburn, no diarrhea, no constipation, no melena and no BRBPR  Genitourinary: No foamy urine, but no dysuria and no hematuria  Musculoskeletal: as noted in HPI  Integumentary photosensitivity, but no rash, no Raynaud's, no alopecia and no nail changes  Endocrine no polyuria and no polydipsia  Hematologic/Lymphatic: no unusual bleeding and no tendency for easy bruising  Neurological: headache and tingling, but no vertigo or dizziness and no weakness  Psychiatric: insomnia and non-restorative sleep  Active Problems    1  Anxiety (300 00) (F41 9)   2  Arthropathy of cervical spine (716 98) (M46 92)   3  Cervical radicular pain (723 4) (M54 12)   4  Colon cancer screening (V76 51) (Z12 11)   5  Depression (311) (F32 9)   6  Diabetes mellitus (250 00) (E11 9)   7  Drug-induced thrombocytopenia (287 49,E980 5) (D69 59)   8  Encounter for monitoring rituximab therapy (V58 83,V58 69) (Z51 81,Z79 899)   9  Glaucoma (365 9) (H40 9)   10   Hepatitis B antibody positive (795 79) (R76 8) 11  History of rheumatoid arthritis (V13 4) (Z87 39)   12  Hyperlipidemia (272 4) (E78 5)   13  Hypertension (401 9) (I10)   14  Hypoestrogenism (256 39) (E28 39)   15  Lung nodules (793 19) (R91 8)   16  Mediterranean anemia (282 40) (D56 9)   17  Myofascial pain (729 1) (M79 1)   18  Neck pain (723 1) (M54 2)   19  Rheumatoid arthritis of multiple sites with negative rheumatoid factor (714 0) (M06 09)   20  Skin cancer, basal cell (173 91) (C44 91)   21  Spondylosis of cervical region without myelopathy or radiculopathy (721 0) (M47 812)   22  Thrombocytopenia (287 5) (D69 6)   23  Visit for routine gyn exam (V72 31) (Z01 419)    Past Medical History    1  Anxiety (300 00) (F41 9)   2  Depression (311) (F32 9)   3  Glaucoma (365 9) (H40 9)   4  History of hepatitis B virus infection (V12 09) (Z86 19)   5  History of rheumatoid arthritis (V13 4) (Z87 39)   6  Hyperlipidemia (272 4) (E78 5)   7  Hypertension (401 9) (I10)   8  Lung nodules (793 19) (R91 8)   9  Mediterranean anemia (282 40) (D56 9)   10  History of On abatacept therapy (V58 69) (Z79 899)   11  History of Seronegative rheumatoid arthritis (714 0) (M06 00)   12  Thrombocytopenia (287 5) (D69 6)   13  History of Transaminitis (790 4) (R74 0)    The active problems and past medical history were reviewed and updated today  Surgical History    1  History of Cholecystectomy Laparoscopic   2  History of Foot Surgery   3  History of Hemorrhoidectomy   4  History of Tubal Ligation    The surgical history was reviewed and updated today  Family History  Mother    1  Denied: Family history of Colon cancer   2  Denied: Family history of Crohn's disease without complication, unspecified   gastrointestinal tract location   3  Family history of hypertension (V17 49) (Z82 49)   4  Denied: Family history of liver disease   5  Family history of malignant neoplasm of breast (V16 3) (Z80 3)   6  Family history of osteoporosis (V17 81) (Z82 62)  Father    7  Family history of CAD (coronary artery disease)   8  Denied: Family history of Colon cancer   9  Denied: Family history of Crohn's disease without complication, unspecified   gastrointestinal tract location   10  Family history of diabetes mellitus (V18 0) (Z83 3)   11  Denied: Family history of liver disease  Family History    12  Denied: Family history of Crohn's disease   15  Family history of malignant neoplasm of breast (V16 3) (Z80 3)   14  Denied: Family history of psoriasis   15  Denied: Family history of rheumatoid arthritis   16  Denied: Family history of systemic lupus erythematosus   17  Denied: Family history of ulcerative colitis   18  Family history of Hypertension, benign    The family history was reviewed and updated today  Social History    · Former smoker (O57 07) (F16 556)   · No alcohol use   · No drug use  The social history was reviewed and updated today  The social history was reviewed and is unchanged  Current Meds   1  Allegra 180 MG TABS; TAKE 1 TABLET DAILY; Therapy: (Recorded:16Ihl6685) to Recorded   2  Atorvastatin Calcium 20 MG Oral Tablet; TAKE 1 TABLET DAILY AT BEDTIME; Therapy: 16Apr2015 to (Evaluate:07Uzh0835) Recorded   3  Folic Acid 1 MG Oral Tablet; TAKE 1 TABLET DAILY; Therapy: (Recorded:34Qfw1953) to Recorded   4  Hydrochlorothiazide 12 5 MG Oral Capsule; Therapy: (Recorded:63Ytg9548) to Recorded   5  Lexapro 20 MG Oral Tablet; TAKE 1 TABLET DAILY; Therapy: 90Lnh3788 to (Evaluate:21Jun2015) Recorded   6  Lumigan 0 01 % Ophthalmic Solution; INSTILL 1 DROP INTO BOTH EYES ONCE DAILY   IN THE EVENING; Therapy: 02XEX9650 to (Evaluate:38Ygt6859) Recorded   7  Rituxan 10 MG/ML CONC; 1000mg IV on day 0 and 14 at [facility]; Therapy: 67Mgn5772 to (Evaluate:80Oym5256); Last Rx:64Bki3025 Ordered   8  TiZANidine HCl - 4 MG Oral Tablet; TAKE 1/2 TO 1 TABLET 3 TIMES DAILY AS NEEDED    Requested for: 11Apr2016; Last Rx:75Ten4126 Ordered   9   Vitamin D3 CAPS; take 1 capsule daily; Therapy: (Recorded:12Dey7893) to Recorded    The medication list was reviewed and updated today  Allergies    1  No Known Drug Allergies    Vitals  Signs [Data Includes: Current Encounter]   Recorded: 49Bqy9781 12:46PM   Heart Rate: 60  Systolic: 010  Diastolic: 80  Weight: 568 lb   BMI Calculated: 29 1  BSA Calculated: 1 69    Physical Exam    Constitutional   General appearance: Abnormal   overweight  Eyes   Conjunctiva and lids: No swelling, erythema or discharge  Pupils and irises: Equal, round and reactive to light  Ears, Nose, Mouth, and Throat   External inspection of ears and nose: Normal     Oropharynx: Normal with no erythema, edema, exudate lesions, or ulcers  Pulmonary   Respiratory effort: No increased work of breathing or signs of respiratory distress  Auscultation of lungs: Clear to auscultation  Cardiovascular   Auscultation of heart: Normal rate and rhythm, normal S1 and S2, without murmurs  Examination of extremities for edema and/or varicosities: Normal     Carotid pulses: Normal     Lymphatic   Palpation of lymph nodes in neck: No lymphadenopathy  Psychiatric   Orientation to person, place, and time: Normal     Mood and affect: Normal       Right 5th Finger PIP tenderness  Left 2nd MCP tenderness and swelling  Left 4th MCP tenderness and swelling  Left wrist tenderness  Left Elbow tenderness  Right ankle tenderness and swelling  Right hip tenderness  Left ankle tenderness and swelling  Right Upper Extremity: Normal ROM  Left Upper Extremity: Normal ROM  Right Lower Extremity: Normal ROM  Left Lower Extremity: Normal ROM  Musculoskeletal - Joints, bones, and muscles: Abnormal  Palpation - bilateral knee crepitus        Results/Data  Results   (1) COMPREHENSIVE METABOLIC PANEL 18PFG1515 71:08JO Naow    Order Number: [id number]  TW Order Number: [id number]     Test Name Result Flag Reference GLUCOSE,RANDM 96 mg/dL     If the patient is fasting, the ADA then defines impaired fasting glucose as > 100 mg/dL and diabetes as > or equal to 123 mg/dL  SODIUM 142 mmol/L  136-145   POTASSIUM 3 4 mmol/L L 3 5-5 3   CHLORIDE 102 mmol/L  100-108   CARBON DIOXIDE 34 mmol/L H 21-32   ANION GAP (CALC) 6 mmol/L  4-13   BLOOD UREA NITROGEN 12 mg/dL  5-25   CREATININE 0 70 mg/dL  0 60-1 30   Standardized to IDMS reference method   CALCIUM 8 9 mg/dL  8 3-10 1   BILI, TOTAL 0 80 mg/dL  0 20-1 00   ALK PHOSPHATAS 66 U/L     ALT (SGPT) 48 U/L  12-78   AST(SGOT) 32 U/L  5-45   ALBUMIN 4 1 g/dL  3 5-5 0   TOTAL PROTEIN 7 1 g/dL  6 4-8 2   eGFR Non-African American      >60 0 ml/min/1 73sq [facility] Disease Education Program recommendations are as follows:  GFR calculation is accurate only with a steady state creatinine  Chronic Kidney disease less than 60 ml/min/1 73 sq  meters  Kidney failure less than 15 ml/min/1 73 sq  meters  Attending Note  Collaborating Physician: I interviewed and examined the patient, I discussed the case with the Advanced Practitioner and reviewed the note and I agree with the Advanced Practitioner note  Comments/Additional Findings: Ms Kylie Vivar reports continued frequent flares of her rheumatoid arthritis despite the Rituxan infusions  She also reports that she feels generally unwell after receiving the infusions  She complains of pain in her neck, lower back, hands, and left ankle  She did have a flare which involves some stiffness and swelling in her left ankle  Her last dose of the Rituxan was in April of this year  She has previously been on Steffen Mews, and methotrexate for rheumatoid arthritis all with lowering of her platelet count  We did discuss several treatment options, and we have opted to start Humira 40 mg subcutaneously every other week  I will obtain an updated Jessica Gasper in preparation for this   She will need to wait until mid August to start these injections for the Rituxan to be completely out of her system  We will recheck a CBC, CMP, ESR, and CRP before her follow-up  We will reevaluate her in 3 months or sooner if her symptoms change        Future Appointments    Date/Time Provider Specialty Site   10/11/2016 01:00 PM JOSE Bender [facility]     Signatures   Electronically signed by : JOSE Robin; Jul 11 2016  1:47PM EST                       (Author)    Electronically signed by : Bishop Cristel DO; Jul 11 2016  1:58PM EST                       (Author)

## 2018-01-10 NOTE — PROGRESS NOTES
Assessment    1  Rheumatoid arthritis of multiple sites with negative rheumatoid factor (714 0) (M06 09)   2  Encounter for monitoring rituximab therapy (V58 83,V58 69) (Z51 81,Z79 899)   3  Spondylosis of cervical region without myelopathy or radiculopathy (721 0) (M47 812)   4  Neck pain (723 1) (M54 2)   5  Hypertension (401 9) (I10)   6  Anxiety (300 00) (F41 9)    Plan     1  (1) CBC/PLT/DIFF; Status:Active; Requested [id number];    2  (1) COMPREHENSIVE METABOLIC PANEL; Status:Active; Requested [id number];    3  (1) C-REACTIVE PROTEIN; Status:Active; Requested [id number];    4  (1) SED RATE; Status:Active; Requested IOT:12VYE5782;    5  Call [phone] if: The pain seems worse ; Status:Complete;   Done: 91BUH4882   6  Call [phone] if: The symptoms seem worse ; Status:Complete;   Done:   15NUD6605   7  Call [phone] if: You have questions or concerns about your problem ;   Status:Complete;   Done: 65GMO6260    8  Humira Pen 40 MG/0 8ML Subcutaneous Pen-injector Kit; Inject 40 mg SC   every other week    Follow-up visit in 4 Months Evaluation and Treatment  Follow-up  Status: Hold For - Scheduling  Requested for: 2017  Ordered; For: Anxiety, Encounter for monitoring rituximab therapy, Hypertension, Neck pain, Rheumatoid arthritis of multiple sites with negative rheumatoid factor, Spondylosis of cervical region without myelopathy or radiculopathy;  Ordered By: Tyler Nixon  Performed:   Due: 69HNY1453     Discussion/Summary    This is a 81-year-old female presenting today for follow-up for rheumatoid arthritis  Patient states that she's been feeling extremely fatigued  She states she is currently dealing with an upper respiratory infection and she was treated with antibiotics however continues to have symptoms  She does have plans to return to her primary care physician tomorrow   She has held her dose of Humira because of recent infection and states that she has not been consistently on Humira for the last several months due to recurrent infection  The patient states that she has no joint pain at this time and denies any obvious joint swelling  She has noticed some increased bruising and states that she is currently being monitored by her primary care physician for a low platelet count  She describes difficulty with sleep as well as nonrestorative sleep  On physical examination, there is no active synovitis  Patient does not currently have any tenderness with palpation of her joints however there is crepitus of bilateral knees  Patient's most recent labs reveal a low platelet level as well as a slightly elevated ALT however otherwise patient's CRP, CBC, and CMP are within normal range  At this time patient's history and physical examination is most consistent with rheumatoid arthritis which appears to be stable at this time with the use of Humira  We will plan to obtain a CBC, CMP, CRP, and ESR at this time  Patient will return to the office in 4 months time and hopefully by then she will be on Humira consistently  She will call in the interim if she has any further questions or concerns and will continue to follow-up with her primary care physician to monitor her platelet levels  Counseling  Rheumatology Counseling Documentation: The patient was counseled regarding diagnostic results, instructions for management, prognosis, patient and family education, risks and benefits of treatment options and importance of compliance with treatment  Chief Complaint  F/U RA   Patient is here today for follow up of chronic conditions described in HPI  History of Present Illness  Patient is in the office today for follow up for RA  Having extreme fatigue but sick at this time  Was on antibiotics at this time  Has been off Humira for a few weeks  No joint pain at this time  No obvious joint swelling at this time  Increased bruising and has low platelet count followed up PCP   No Am stiffness  +Difficulty with sleep since menopause  +non restorative sleep  Not taking any OTC medication for pain  Kush Herring RAPID3: 9 3 /30      Review of Systems    Constitutional: +sweats, recent 5 lb weight gain and fatigue, but no fever, no chills and no anorexia  HEENT: feeling congested, but no blurred vision, no double vision, no amaurosis fugax, no eye pain, no erythema eye(s), no dryness mouth and no sore throat    The patient presents with complaints of dryness of the eyes  Symptoms are improved by artificial tears  The patient presents with complaints of mouth sores (+nasal)   Cardiovascular: no chest pain or pressure, no dyspnea on exertion and no swelling in the arms or legs  Respiratory: no shortness of breath and no pleurisy    The patient presents with complaints of unusual or persistent cough, described as dry  Gastrointestinal: no abdominal pain, no nausea, no vomiting, no heartburn, no diarrhea, no constipation, no melena and no BRBPR  Genitourinary: No foamy urine, but no dysuria and no hematuria  Musculoskeletal: as noted in HPI  Integumentary nail changes and photosensitivity, but no rash, no Raynaud's and no alopecia  Endocrine no polyuria and no polydipsia  Hematologic/Lymphatic: no unusual bleeding    The patient presents with complaints of a tendency for easy bruising  Symptoms are worsening  Neurological: headache, but no vertigo or dizziness, no tingling and no weakness  Psychiatric: insomnia and non-restorative sleep  Active Problems    1  Anxiety (300 00) (F41 9)   2  Arthropathy of cervical spine (716 98) (M46 92)   3  Cervical radicular pain (723 4) (M54 12)   4  Colon cancer screening (V76 51) (Z12 11)   5  Depression (311) (F32 9)   6  Diabetes mellitus (250 00) (E11 9)   7  Drug-induced thrombocytopenia (287 49,E980 5) (D69 59)   8  Encounter for monitoring rituximab therapy (V58 83,V58 69) (Z51 81,Z79 899)   9  Glaucoma (365 9) (H40 9)   10   Hepatitis B antibody positive (795 79) (R76 8)   11  History of rheumatoid arthritis (V13 4) (Z87 39)   12  Hyperlipidemia (272 4) (E78 5)   13  Hypertension (401 9) (I10)   14  Hypoestrogenism (256 39) (E28 39)   15  Lung nodules (793 19) (R91 8)   16  Mediterranean anemia (282 40) (D56 9)   17  Myofascial pain (729 1) (M79 1)   18  Neck pain (723 1) (M54 2)   19  Rheumatoid arthritis of multiple sites with negative rheumatoid factor (714 0) (M06 09)   20  Skin cancer, basal cell (173 91) (C44 91)   21  Spondylosis of cervical region without myelopathy or radiculopathy (721 0) (M47 812)   22  Thrombocytopenia (287 5) (D69 6)   23  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)   24  Visit for routine gyn exam (V72 31) (Z01 419)    Past Medical History    1  Anxiety (300 00) (F41 9)   2  Depression (311) (F32 9)   3  Glaucoma (365 9) (H40 9)   4  History of hepatitis B virus infection (V12 09) (Z86 19)   5  History of rheumatoid arthritis (V13 4) (Z87 39)   6  Hyperlipidemia (272 4) (E78 5)   7  Hypertension (401 9) (I10)   8  Lung nodules (793 19) (R91 8)   9  Mediterranean anemia (282 40) (D56 9)   10  History of On abatacept therapy (V58 69) (Z79 899)   11  History of Seronegative rheumatoid arthritis (714 0) (M06 00)   12  Thrombocytopenia (287 5) (D69 6)   13  History of Transaminitis (790 4) (R74 0)    The active problems and past medical history were reviewed and updated today  Surgical History    1  History of Cholecystectomy Laparoscopic   2  History of Foot Surgery   3  History of Hemorrhoidectomy   4  History of Tubal Ligation    The surgical history was reviewed and updated today  Family History  Mother    1  Denied: Family history of Colon cancer   2  Denied: Family history of Crohn's disease without complication, unspecified   gastrointestinal tract location   3  Family history of hypertension (V17 49) (Z82 49)   4  Denied: Family history of liver disease   5   Family history of malignant neoplasm of breast (V16 3) (Z80 3)   6  Family history of osteoporosis (V17 81) (Z82 62)  Father    7  Family history of CAD (coronary artery disease)   8  Denied: Family history of Colon cancer   9  Denied: Family history of Crohn's disease without complication, unspecified   gastrointestinal tract location   10  Family history of diabetes mellitus (V18 0) (Z83 3)   11  Denied: Family history of liver disease  Family History    12  Denied: Family history of Crohn's disease   15  Family history of malignant neoplasm of breast (V16 3) (Z80 3)   14  Denied: Family history of psoriasis   15  Denied: Family history of rheumatoid arthritis   16  Denied: Family history of systemic lupus erythematosus   17  Denied: Family history of ulcerative colitis   18  Family history of Hypertension, benign    The family history was reviewed and updated today  Social History    · Former smoker (E62 33) (Y52 107)   · No alcohol use   · No drug use  The social history was reviewed and updated today  The social history was reviewed and is unchanged  Current Meds   1  Allegra 180 MG TABS; TAKE 1 TABLET DAILY; Therapy: (Recorded:22May2015) to Recorded   2  Atorvastatin Calcium 20 MG Oral Tablet; TAKE 1 TABLET DAILY AT BEDTIME; Therapy: 16Apr2015 to (Evaluate:29Lxc9147) Recorded   3  Folic Acid 1 MG Oral Tablet; TAKE 1 TABLET DAILY; Therapy: (Recorded:22May2015) to Recorded   4  Humira Pen 40 MG/0 8ML Subcutaneous Pen-injector Kit; Inject 40 mg SC every other   week; Therapy: 30LDL8656 to (Evaluate:10Aug2016)  Requested for: 53XBK0177; Last   Rx:33Xdc0110 Ordered   5  HydroCHLOROthiazide 12 5 MG Oral Capsule; Therapy: (Recorded:93Ney2478) to Recorded   6  Lexapro 20 MG Oral Tablet; TAKE 1 TABLET DAILY; Therapy: 56Azz0633 to (Evaluate:21Jun2015) Recorded   7  Lumigan 0 01 % Ophthalmic Solution; INSTILL 1 DROP INTO BOTH EYES ONCE DAILY   IN THE EVENING; Therapy: 63WZB3724 to (Evaluate:47Dai5038) Recorded   8   TiZANidine HCl - 4 MG Oral Tablet; TAKE HALF TO 1 TABLET 3 TIMES DAILY AS   NEEDED; Therapy: 54KPN5283 to (Evaluate:99Liu4529)  Requested for: 13CEQ1874; Last   Rx:12Oct2016 Ordered   9  Vitamin D3 CAPS; take 1 capsule daily; Therapy: (Recorded:05Jit8766) to Recorded    The medication list was reviewed and updated today  Allergies    1  No Known Drug Allergies    Vitals  Signs   Recorded: 12Jan2017 01:04PM   Heart Rate: 68  Systolic: 849  Diastolic: 80  Weight: 249 lb 6 oz  BMI Calculated: 30 49  BSA Calculated: 1 72    Physical Exam    Constitutional   General appearance: Abnormal   overweight  Eyes   Conjunctiva and lids: No swelling, erythema or discharge  Pupils and irises: Equal, round and reactive to light  Ears, Nose, Mouth, and Throat   External inspection of ears and nose: Normal     Oropharynx: Normal with no erythema, edema, exudate lesions, or ulcers  Pulmonary   Respiratory effort: No increased work of breathing or signs of respiratory distress  Auscultation of lungs: Clear to auscultation  Cardiovascular   Auscultation of heart: Normal rate and rhythm, normal S1 and S2, without murmurs  Examination of extremities for edema and/or varicosities: Normal     Carotid pulses: Normal     Lymphatic   Palpation of lymph nodes in neck: No lymphadenopathy  Psychiatric   Orientation to person, place, and time: Normal     Mood and affect: Normal         Right Upper Extremity: Normal ROM  Left Upper Extremity: Normal ROM  Right Lower Extremity: Normal ROM  Left Lower Extremity: Normal ROM  Musculoskeletal - Joints, bones, and muscles: Abnormal  Palpation - bilateral knee crepitus       Right hand: All MCP, PIP and DIP joints are normal  Left Hand: All MCP, PIP and DIP joints are normal    Right foot: All MTP, PIP and DIP joints are normal  Left foot: All MTP, PIP and DIP joints are normal       Results/Data  (1) CBC/PLT/DIFF 60WEY4919 12:11PM [facility]     Test Name Result Flag Reference WBC COUNT 5 41 Thousand/uL  4 31-10 16   RBC COUNT 5 59 Million/uL H 3 81-5 12   HEMOGLOBIN 11 9 g/dL  11 5-15 4   HEMATOCRIT 36 2 %  34 8-46  1   MCV 65 fL L 82-98   MCH 21 3 pg L 26 8-34 3   MCHC 32 9 g/dL  31 4-37 4   RDW 17 2 % H 11 6-15 1   PLATELET COUNT 612 Thousands/uL L 149-390   NEUTROPHILS RELATIVE PERCENT 64 %  43-75   LYMPHOCYTES RELATIVE PERCENT 24 %  14-44   MONOCYTES RELATIVE PERCENT 11 %  4-12   EOSINOPHILS RELATIVE PERCENT 1 %  0-6   BASOPHILS RELATIVE PERCENT 0 %  0-1   NEUTROPHILS ABSOLUTE COUNT 3 47 Thousands/?L  1 85-7 62   LYMPHOCYTES ABSOLUTE COUNT 1 32 Thousands/?L  0 60-4 47   MONOCYTES ABSOLUTE COUNT 0 58 Thousand/?L  0 17-1 22   EOSINOPHILS ABSOLUTE COUNT 0 03 Thousand/?L  0 00-0 61   BASOPHILS ABSOLUTE COUNT 0 01 Thousands/?L  0 00-0 10     (1) C-REACTIVE PROTEIN 56Onv0430 12:11PM [facility]     Test Name Result Flag Reference   C-REACT PROTEIN <3 0 mg/L  <3 0     (1) COMPREHENSIVE METABOLIC PANEL 66SSR8778 72:09KA [facility]     Test Name Result Flag Reference   GLUCOSE,RANDM 184 mg/dL H    If the patient is fasting, the ADA then defines impaired fasting glucose as > 100 mg/dL and diabetes as > or equal to 123 mg/dL     SODIUM 144 mmol/L  136-145   POTASSIUM 3 5 mmol/L  3 5-5 3   CHLORIDE 107 mmol/L  100-108   CARBON DIOXIDE 28 mmol/L  21-32   ANION GAP (CALC) 9 mmol/L  4-13   BLOOD UREA NITROGEN 13 mg/dL  5-25   CREATININE 0 74 mg/dL  0 60-1 30   Standardized to IDMS reference method   CALCIUM 8 8 mg/dL  8 3-10 1   BILI, TOTAL 0 70 mg/dL  0 20-1 00   ALK PHOSPHATAS 62 U/L     ALT (SGPT) 99 U/L H 12-78   AST(SGOT) 37 U/L  5-45   ALBUMIN 3 7 g/dL  3 5-5 0   TOTAL PROTEIN 6 6 g/dL  6 4-8 2   eGFR Non-African American      >60 0 ml/min/1 73sq m   [facility] Disease Education Program recommendations are as follows:  GFR calculation is accurate only with a steady state creatinine  Chronic Kidney disease less than 60 ml/min/1 73 sq  meters  Kidney failure less than 15 ml/min/1 73 sq  meters  Health Management  Colon cancer screening   COLONOSCOPY; every 3 years; Last 55Hdh6623; Next Due: 37Agq1786; Active    Attending Note  Collaborating Physician: I did not interview and examine the patient, I discussed the case with the Advanced Practitioner and reviewed the note and I agree with the Advanced Practitioner note  Future Appointments    Date/Time Provider Specialty Site   03/06/2017 08:30 AM MICHAELLE Daniels   Gastroenterology Adult [facility] OUTPATIENT   05/12/2017 01:00 PM JOSE Trujillo [facility]  [address] [facility]     Signatures   Electronically signed by : JOSE Alberto; Jan 12 2017  2:08PM EST                       (Author)    Electronically signed by : Megan Scott DO; Jan 12 2017  2:37PM EST                       (Author)

## 2018-01-10 NOTE — PROGRESS NOTES
Assessment    1  Rheumatoid arthritis of multiple sites with negative rheumatoid factor (714 0) (M06 09)   2  Cervical radicular pain (723 4) (M54 12)   3  Spondylosis of cervical region without myelopathy or radiculopathy (721 0) (M47 812)   4  Myofascial pain (729 1) (M79 1)   5  Chronic ITP (idiopathic thrombocytopenia) (287 31) (D69 3)   6  Hypertension (401 9) (I10)   7  Diabetes mellitus (250 00) (E11 9)   8  Depression (311) (F32 9)    Plan    1  Xeljanz XR 11 MG Oral Tablet Extended Release 24 Hour; Take 1 tablet daily   2  (1) CBC/PLT/DIFF; Status:Active; Requested RJR:73TUI4637;    3  (1) COMPREHENSIVE METABOLIC PANEL; Status:Active; Requested BHC:45QYF9800;    4  (1) C-REACTIVE PROTEIN; Status:Active; Requested GUU:04BDD6694;    5  (1) QUANTIFERON - TB GOLD; Status:Active; Requested for:88Xvb8781;    6  (1) SED RATE; Status:Active; Requested CTU:70BAT1781;    7  Call [phone] if: The pain seems worse ; Status:Complete;   Done: 46FGM9564   8  Call [phone] if: The symptoms seem worse ; Status:Complete;   Done:   36HNS9703   9  Call [phone] if: You have questions or concerns about your problem ;   Status:Complete;   Done: 85QBB4724   10  Follow-up visit in 2 months Evaluation and Treatment  Follow-up  Status: Complete     Done: 74JKF7216    Discussion/Summary    Ms Mtz reports feeling overwhelmed right now  She reports that her platelets recently dropped to 80, and she did see hematology at [facility] and was diagnosed with ITP  They told her that her thrombocytopenia was not likely due to any of her biologic medications for her rheumatoid arthritis  She has been off of Humira since her last evaluation  She does report that she is beginning to have some increased pain, mainly in her feet, wrists, neck, and right first toe when flexing it   She does report that in the past she did quite well with [place], and felt "normal", but it was discontinued because it was felt to be the cause of her thrombocytopenia at that time  She is interested in retrying this agent if possible  She does report that her neck pain is now spreading to her mid back  She is seeing pain management, but she has not been getting better despite trigger point injections  She is not currently utilizing any additional medications for her pain at this time  She does report some erythema of her left foot  She denies any obvious joint swelling  She does report she has fallen several times since her last evaluation  She does report gelling when sitting for prolonged periods of time  She denies any significant morning stiffness  She does report difficulty sleeping, but she does not attribute this to pain  She also reports nonrestorative sleep, but she denies any fatigue  On exam, there is mild synovitis of the PIPs of the hands, as well as the bilateral ankles  There is no other active synovitis  No recent rheumatologic labs were available for review today  At this time, her seronegative rheumatoid arthritis does appear mildly active off of any DMARD or biologic therapy  Since she previously had a favorable response to [facility], we will try this medication again  We will obtain a prior authorization for this  I did provide her with some samples to get started with the medication  I would like to check an updated Franceen Mouse for TB at this time  I will also check an updated CBC, CMP, ESR, and CRP before her follow-up  I will reevaluate her in 2 months  She will call in the interim if there are any questions or concerns  Patient is able to Self-Care  Counseling  Rheumatology Counseling Documentation: The patient was counseled regarding diagnostic results, instructions for management, impressions and risks and benefits of treatment options  Chief Complaint  F/U RA   Patient is here today for follow up of chronic conditions described in HPI  History of Present Illness  Pt  returns for F/U for RA   Feeling overwhelmed right now  Platelets dropped to 85  Saw Heme and told he had ITP  Has been off Humira since then  Starting to have increased pain  Did well with [place] and would like to retake  c/o pain in R 1st toe when flexing it  Also with pain in feet, wrists, and neck  Neck pain is spreading to back  Going to Pain Mgmt  Getting trigger point injections with some relief  No pain meds taken at this time  + erythema of L foot  No obvious joint swelling  Has fallen 3 times recently  + gelling when sitting for periods of time  No significant AM stiffness  + difficulty sleeping -> not due to pain  + non-restorative sleep  No significant fatigue  RAPID3: 21 0/30      Review of Systems    Constitutional: recent weight gain, but no fever, fatigue, no chills, no recent weight loss and no anorexia  HEENT: dryness of the eyes and feeling congested, but no blurred vision, no double vision, no amaurosis fugax, no eye pain, no erythema eye(s), no dryness mouth, no mouth sores and no sore throat  Cardiovascular: no chest pain or pressure, no dyspnea on exertion and no swelling in the arms or legs  Respiratory: no unusual or persistent cough, no shortness of breath and no pleurisy  Gastrointestinal: heartburn and constipation, but no nausea, no vomiting, no diarrhea, no melena and no BRBPR    The patient presents with complaints of occasional episodes of abdominal pain  Genitourinary: no foamy urine, but no dysuria and no hematuria  Musculoskeletal: as noted in HPI  Integumentary alopecia and photosensitivity, but no rash, no Raynaud's and no nail changes  Hematologic/Lymphatic: a tendency for easy bruising, but no unusual bleeding  Neurological: headache and tingling, but no vertigo or dizziness and no weakness  Psychiatric: insomnia, non-restorative sleep and depression  Active Problems    1  Anxiety (300 00) (F41 9)   2  Arthropathy of cervical spine (716 98) (M46 92)   3   Cervical radicular pain (723 4) (M54 12)   4  Chronic ITP (idiopathic thrombocytopenia) (287 31) (D69 3)   5  Colon cancer screening (V76 51) (Z12 11)   6  Depression (311) (F32 9)   7  Diabetes mellitus (250 00) (E11 9)   8  Glaucoma (365 9) (H40 9)   9  History of rheumatoid arthritis (V13 4) (Z87 39)   10  Hyperlipidemia (272 4) (E78 5)   11  Hypertension (401 9) (I10)   12  Hypoestrogenism (256 39) (E28 39)   13  Lung nodules (793 19) (R91 8)   14  Mediterranean anemia (282 40) (D56 9)   15  Myofascial pain (729 1) (M79 1)   16  Neck pain (723 1) (M54 2)   17  Rheumatoid arthritis of multiple sites with negative rheumatoid factor (714 0) (M06 09)   18  Skin cancer, basal cell (173 91) (C44 91)   19  Spondylosis of cervical region without myelopathy or radiculopathy (721 0) (M47 812)   20  Thrombocytopenia (287 5) (D69 6)   21  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)    Past Medical History    1  Anxiety (300 00) (F41 9)   2  Depression (311) (F32 9)   3  History of Drug-induced thrombocytopenia (287 49,E980 5) (D69 59)   4  History of Encounter for monitoring rituximab therapy (V58 83,V58 69) (Z51 81,Z79 899)   5  Glaucoma (365 9) (H40 9)   6  History of Hepatitis B antibody positive (795 79) (R76 8)   7  History of hepatitis B virus infection (V12 09) (Z86 19)   8  History of ITP (V12 3) (Z86 2)   9  History of rheumatoid arthritis (V13 4) (Z87 39)   10  Hyperlipidemia (272 4) (E78 5)   11  Hypertension (401 9) (I10)   12  Lung nodules (793 19) (R91 8)   13  Mediterranean anemia (282 40) (D56 9)   14  History of On abatacept therapy (V58 69) (Z79 899)   15  History of Seronegative rheumatoid arthritis (714 0) (M06 00)   16  Thrombocytopenia (287 5) (D69 6)   17  History of Transaminitis (790 4) (R74 0)   18  History of Visit for routine gyn exam (V72 31) (Z01 419)    The active problems and past medical history were reviewed and updated today  Surgical History    1  History of Cholecystectomy Laparoscopic   2   History of Foot Surgery   3  History of Hemorrhoidectomy   4  History of Tubal Ligation    The surgical history was reviewed and updated today  Family History  Mother    1  Denied: Family history of Colon cancer   2  Denied: Family history of Crohn's disease without complication, unspecified   gastrointestinal tract location   3  Family history of hypertension (V17 49) (Z82 49)   4  Denied: Family history of liver disease   5  Family history of malignant neoplasm of breast (V16 3) (Z80 3)   6  Family history of osteoporosis (V17 81) (Z82 62)  Father    7  Family history of CAD (coronary artery disease)   8  Denied: Family history of Colon cancer   9  Denied: Family history of Crohn's disease without complication, unspecified   gastrointestinal tract location   10  Family history of diabetes mellitus (V18 0) (Z83 3)   11  Denied: Family history of liver disease  Family History    12  Denied: Family history of Crohn's disease   15  Family history of malignant neoplasm of breast (V16 3) (Z80 3)   14  Denied: Family history of psoriasis   15  Denied: Family history of rheumatoid arthritis   16  Denied: Family history of systemic lupus erythematosus   17  Denied: Family history of ulcerative colitis   18  Family history of Hypertension, benign    The family history was reviewed and updated today  Social History    · Former smoker (Z39 61) (G45 237)   · No alcohol use   · No drug use  The social history was reviewed and updated today  The social history was reviewed and is unchanged  Current Meds   1  Allegra 180 MG TABS; TAKE 1 TABLET DAILY; Therapy: (Recorded:34Bjj8260) to Recorded   2  Atorvastatin Calcium 20 MG Oral Tablet; TAKE 1 TABLET DAILY AT BEDTIME; Therapy: 42Tfx8400 to (Evaluate:68Xoz2092) Recorded   3  Lexapro 20 MG Oral Tablet; TAKE 1 TABLET DAILY; Therapy: 81Poj6742 to (Evaluate:21Jun2015) Recorded   4   Lumigan 0 01 % Ophthalmic Solution; INSTILL 1 DROP INTO BOTH EYES ONCE DAILY   IN THE EVENING; Therapy: 32JQC7137 to (Evaluate:07Igp2015) Recorded   5  Vitamin D3 CAPS; take 1 capsule daily; Therapy: (Recorded:99Gpj9028) to Recorded    The medication list was reviewed and updated today  Allergies    1  No Known Drug Allergies    Vitals  Signs   Recorded: 90PNZ0251 01:31PM   Heart Rate: 68  Systolic: 666  Diastolic: 82  Height: 5 ft 1 in    Physical Exam    Constitutional   General appearance: No acute distress, well appearing and well nourished  Eyes   Conjunctiva and lids: No swelling, erythema or discharge  Pupils and irises: Equal, round and reactive to light  Ears, Nose, Mouth, and Throat   External inspection of ears and nose: Normal     Oropharynx: Abnormal   (+ upper denture) Oral mucosa was dry  Pulmonary   Respiratory effort: No increased work of breathing or signs of respiratory distress  Auscultation of lungs: Clear to auscultation  Cardiovascular   Auscultation of heart: Normal rate and rhythm, normal S1 and S2, without murmurs  Examination of extremities for edema and/or varicosities: Normal     Lymphatic   Palpation of lymph nodes in neck: No lymphadenopathy  Psychiatric   Orientation to person, place, and time: Normal     Mood and affect: Normal         Right wrist tenderness  Right ankle tenderness and swelling  Left ankle tenderness and swelling  Musculoskeletal - Joints, bones, and muscles: Abnormal  Palpation - bilateral knee crepitus  Skin - Skin and subcutaneous tissue: Normal    Neurologic - Sensation: Normal    Additional Findings - Decreased ROM C-spine  The patient has tenderness in all PIP joints of the right hand  The patient has tenderness in all PIP joints of the left hand  The patient has swelling of all PIP joints of the right hand  The patient has swelling of all PIP joints of the left hand        Results/Data  (1) CBC/PLT/DIFF 22XSD4660 01:55PM Aleyda Doty Order Number: [id number]     Test Name Result Flag Reference   WBC COUNT 5 71 Thousand/uL  4 31-10 16   RBC COUNT 5 87 Million/uL H 3 81-5 12   HEMOGLOBIN 12 4 g/dL  11 5-15 4   HEMATOCRIT 38 0 %  34 8-46  1   MCV 65 fL L 82-98   MCH 21 1 pg L 26 8-34 3   MCHC 32 6 g/dL  31 4-37 4   RDW 16 6 % H 11 6-15 1   PLATELET COUNT 685 Thousands/uL L 149-390   Results verified by repeat   NEUTROPHILS RELATIVE PERCENT 62 %  43-75   LYMPHOCYTES RELATIVE PERCENT 26 %  14-44   MONOCYTES RELATIVE PERCENT 11 %  4-12   EOSINOPHILS RELATIVE PERCENT 1 %  0-6   BASOPHILS RELATIVE PERCENT 0 %  0-1   NEUTROPHILS ABSOLUTE COUNT 3 55 Thousands/? ??L  1 85-7 62   LYMPHOCYTES ABSOLUTE COUNT 1 50 Thousands/? ??L  0 60-4 47   MONOCYTES ABSOLUTE COUNT 0 60 Thousand/? ??L  0 17-1 22   EOSINOPHILS ABSOLUTE COUNT 0 05 Thousand/? ??L  0 00-0 61   BASOPHILS ABSOLUTE COUNT 0 01 Thousands/? ??L  0 00-0 10     (1) COMPREHENSIVE METABOLIC PANEL 14MIU3782 16:99KX Ilsa Newmany    Order Number: [id number]     Test Name Result Flag Reference   GLUCOSE,RANDM 123 mg/dL     If the patient is fasting, the ADA then defines impaired fasting glucose as > 100 mg/dL and diabetes as > or equal to 123 mg/dL  SODIUM 142 mmol/L  136-145   POTASSIUM 3 7 mmol/L  3 5-5 3   CHLORIDE 106 mmol/L  100-108   CARBON DIOXIDE 30 mmol/L  21-32   ANION GAP (CALC) 6 mmol/L  4-13   BLOOD UREA NITROGEN 10 mg/dL  5-25   CREATININE 0 72 mg/dL  0 60-1 30   Standardized to IDMS reference method   CALCIUM 8 9 mg/dL  8 3-10 1   BILI, TOTAL 0 70 mg/dL  0 20-1 00   ALK PHOSPHATAS 81 U/L     ALT (SGPT) 57 U/L  12-78   AST(SGOT) 32 U/L  5-45   ALBUMIN 3 7 g/dL  3 5-5 0   TOTAL PROTEIN 6 8 g/dL  6 4-8 2   eGFR Non-African American      >60 0 ml/min/1 73sq m   [facility] Disease Education Program recommendations are as follows:  GFR calculation is accurate only with a steady state creatinine  Chronic Kidney disease less than 60 ml/min/1 73 sq  meters  Kidney failure less than 15 ml/min/1 73 sq  meters       (1) C-REACTIVE PROTEIN 97RLY5516 01:55PM Vinay Aldridge   TW Order Number: [id number]     Test Name Result Flag Reference   C-REACT PROTEIN <3 0 mg/L  <3 0     (1) SED RATE 23TNQ0380 01:55PM Vinay Aldridge   TW Order Number: [id number]     Test Name Result Flag Reference   SED RATE 3 mm/hour  0-20       Health Management  Tubulovillous adenoma polyp of colon   COLONOSCOPY; every 3 years; Last 25TUX6404; Next Due: 67LGO2187;  Active    Future Appointments    Date/Time Provider Specialty Site   09/19/2017 01:20 PM Sparkle Gamino DO Rheumatology [address] [facility]   11/20/2017 01:20 PM Vinay Aldridge, [facility] Rheumatology [address] [facility]     Signatures   Electronically signed by : Ahsan Lancaster DO; Jul 19 2017  4:56PM EST                       (Author)

## 2018-01-11 NOTE — PROGRESS NOTES
Assessment   1  Rheumatoid arthritis of multiple sites with negative rheumatoid factor (714 0) (M06 09)  2  Spondylosis of cervical region without myelopathy or radiculopathy (721 0) (M47 812)  3  Neck pain (723 1) (M54 2)  4  Hypertension (401 9) (I10)  5  Anxiety (300 00) (F41 9)  6  History of ITP (V12 3) (Z86 2)    Plan   1  (1) CBC/PLT/DIFF; Status:Active; Requested for:36Pnt1467;   2  (1) COMPREHENSIVE METABOLIC PANEL; Status:Active; Requested for:19Ndw7133;   3  (1) C-REACTIVE PROTEIN; Status:Active; Requested for:71Yoq4926;   4  (1) SED RATE; Status:Active; Requested for:08Bxs0016;   5  Call [phone] if: The pain seems worse ; Status:Complete;   Done: 75KLI0543  6  Call [phone] if: The symptoms seem worse ; Status:Complete;   Done:   14OKL7616  7  Call [phone] if: You have questions or concerns about your problem ;   Status:Complete;   Done: 54OXX0057  8  Follow-up visit in 6 months Evaluation and Treatment  Follow-up  Status: Hold For -   Scheduling  Requested for: 08VEY6157    Discussion/Summary    This is a 78-year-old female presenting today for follow-up for rheumatoid arthritis  Patient states that she has had increased fatigue since last appointment  She also followed up with a hematologist at [facility] who stated that patient does have ITP  Patient is having routine blood work with that physician  She states that she does have neck pain and does receive trigger point injections for her pain with some relief through pain management  She also describes bilateral foot pain worse with activity  She denies any further joint pain and denies any obvious joint swelling  She states her neck pain does result in daily headaches as well as increased pain with lifting heavy objects  She does no difficulty with sleep as well as nonrestorative sleep  On physical examination, there is no active synovitis  Patient does have tenderness of the cervical spine as well as bilateral feet  There is crepitus of the knees  At this time patient's history and physical examination is most consistent with rheumatoid arthritis which appears to be stable off of medication at this time  Patient would like to continue to monitor her rheumatoid arthritis off medication  We will plan to obtain a CBC, CMP, CRP, and ESR at this time  She will plan to return to the office in 6 months time for further evaluation however will contact the office in the interim if she has any further questions or concerns  Counseling  Rheumatology Counseling Documentation: The patient was counseled regarding instructions for management, prognosis, patient and family education, risks and benefits of treatment options and importance of compliance with treatment  Chief Complaint  F/U RA   Patient is here today for follow up of chronic conditions described in HPI  History of Present Illness  Patient is in the office today for follow up for RA  Having increased fatigue  Saw heme in [facility] and thinks patient has ITP  Having routine blood work  +Some foot pain only with activity  Pain in the neck as well  No other joint pain  Headaches daily  Saw pain management and having TPI for pain with some relief  Worse with lifting  No Am stiffness  +Difficulty with sleep due to menopause  +non restorative sleep  +Fatigue  RAPID3: 17 0/30      Review of Systems    Constitutional: recent 10 lb weight gain and fatigue, but no fever, no chills, no recent weight loss and no anorexia  HEENT: no blurred vision, no double vision, no amaurosis fugax, no eye pain, no erythema eye(s), no dryness mouth, no mouth sores, not feeling congested and no sore throat    The patient presents with complaints of dryness of the eyes  Symptoms are improved by artificial tears  Cardiovascular: no chest pain or pressure, no dyspnea on exertion and no swelling in the arms or legs     Respiratory: no unusual or persistent cough, no shortness of breath and no pleurisy  Gastrointestinal: heartburn and constipation, but no abdominal pain, no nausea, no vomiting, no diarrhea, no melena and no BRBPR  Genitourinary: No foamy urine, but no dysuria and no hematuria  Musculoskeletal: as noted in HPI  Integumentary no rash, no Raynaud's, no alopecia and no nail changes  Endocrine no polyuria and no polydipsia  Hematologic/Lymphatic: a tendency for easy bruising, but no unusual bleeding  Neurological: headache, tingling and weakness, but no vertigo or dizziness  Psychiatric: insomnia and non-restorative sleep  Active Problems   1  Anxiety (300 00) (F41 9)  2  Arthropathy of cervical spine (716 98) (M46 92)  3  Cervical radicular pain (723 4) (M54 12)  4  Colon cancer screening (V76 51) (Z12 11)  5  Depression (311) (F32 9)  6  Diabetes mellitus (250 00) (E11 9)  7  Drug-induced thrombocytopenia (287 49,E980 5) (D69 59)  8  Encounter for monitoring rituximab therapy (V58 83,V58 69) (Z51 81,Z79 899)  9  Glaucoma (365 9) (H40 9)  10  Hepatitis B antibody positive (795 79) (R76 8)  11  History of rheumatoid arthritis (V13 4) (Z87 39)  12  Hyperlipidemia (272 4) (E78 5)  13  Hypertension (401 9) (I10)  14  Hypoestrogenism (256 39) (E28 39)  15  Lung nodules (793 19) (R91 8)  16  Mediterranean anemia (282 40) (D56 9)  17  Myofascial pain (729 1) (M79 1)  18  Neck pain (723 1) (M54 2)  19  Rheumatoid arthritis of multiple sites with negative rheumatoid factor (714 0) (M06 09)  20  Skin cancer, basal cell (173 91) (C44 91)  21  Spondylosis of cervical region without myelopathy or radiculopathy (721 0) (M47 812)  22  Thrombocytopenia (287 5) (D69 6)  23  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)  24  Visit for routine gyn exam (V72 31) (Z01 419)    Past Medical History   1  Anxiety (300 00) (F41 9)  2  Depression (311) (F32 9)  3  Glaucoma (365 9) (H40 9)  4  History of hepatitis B virus infection (V12 09) (Z86 19)  5  History of ITP (V12 3) (Z86 2)  6  History of rheumatoid arthritis (V13 4) (Z87 39)  7  Hyperlipidemia (272 4) (E78 5)  8  Hypertension (401 9) (I10)  9  Lung nodules (793 19) (R91 8)  10  Mediterranean anemia (282 40) (D56 9)  11  History of On abatacept therapy (V58 69) (Z79 899)  12  History of Seronegative rheumatoid arthritis (714 0) (M06 00)  13  Thrombocytopenia (287 5) (D69 6)  14  History of Transaminitis (790 4) (R74 0)    The active problems and past medical history were reviewed and updated today  Surgical History   1  History of Cholecystectomy Laparoscopic  2  History of Foot Surgery  3  History of Hemorrhoidectomy  4  History of Tubal Ligation    The surgical history was reviewed and updated today  Family History  Mother   1  Denied: Family history of Colon cancer  2  Denied: Family history of Crohn's disease without complication, unspecified   gastrointestinal tract location  3  Family history of hypertension (V17 49) (Z82 49)  4  Denied: Family history of liver disease  5  Family history of malignant neoplasm of breast (V16 3) (Z80 3)  6  Family history of osteoporosis (V17 81) (Z82 62)  Father   7  Family history of CAD (coronary artery disease)  8  Denied: Family history of Colon cancer  9  Denied: Family history of Crohn's disease without complication, unspecified   gastrointestinal tract location  10  Family history of diabetes mellitus (V18 0) (Z83 3)  11  Denied: Family history of liver disease  Family History   12  Denied: Family history of Crohn's disease  15  Family history of malignant neoplasm of breast (V16 3) (Z80 3)  14  Denied: Family history of psoriasis  15  Denied: Family history of rheumatoid arthritis  16  Denied: Family history of systemic lupus erythematosus  17  Denied: Family history of ulcerative colitis  18  Family history of Hypertension, benign    The family history was reviewed and updated today         Social History    · Former smoker (V07 93) (C99 294)   · No alcohol use   · No drug use  The social history was reviewed and updated today  The social history was reviewed and is unchanged  Current Meds  1  Allegra 180 MG TABS; TAKE 1 TABLET DAILY; Therapy: (Recorded:22May2015) to Recorded  2  Atorvastatin Calcium 20 MG Oral Tablet; TAKE 1 TABLET DAILY AT BEDTIME; Therapy: 16Apr2015 to (Evaluate:20Aug2015) Recorded  3  Folic Acid 1 MG Oral Tablet; TAKE 1 TABLET DAILY; Therapy: (Recorded:22May2015) to Recorded  4  Golytely 227 1 GM Oral Solution Reconstituted; TAKE AS DIRECTED; Therapy: 29Aug2016 to (Last Rx:86Omy3028)  Requested for: 14MCM7485 Ordered  5  Lexapro 20 MG Oral Tablet; TAKE 1 TABLET DAILY; Therapy: 04Apr2015 to (Evaluate:21Jun2015) Recorded  6  Lumigan 0 01 % Ophthalmic Solution; INSTILL 1 DROP INTO BOTH EYES ONCE DAILY   IN THE EVENING; Therapy: 73ONS7434 to (Evaluate:20Aug2015) Recorded  7  Vitamin D3 CAPS; take 1 capsule daily; Therapy: (Recorded:22May2015) to Recorded    Allergies   1  No Known Drug Allergies    Vitals  Signs   Recorded: 21CDW6447 01:29PM   Heart Rate: 66  Systolic: 620  Diastolic: 84  Weight: 893 lb 2 oz  BMI Calculated: 30 44  BSA Calculated: 1 72    Physical Exam    Constitutional   General appearance: Abnormal   overweight  Eyes   Conjunctiva and lids: No swelling, erythema or discharge  Pupils and irises: Equal, round and reactive to light  Ears, Nose, Mouth, and Throat   External inspection of ears and nose: Normal     Oropharynx: Normal with no erythema, edema, exudate lesions, or ulcers  Pulmonary   Respiratory effort: No increased work of breathing or signs of respiratory distress  Auscultation of lungs: Clear to auscultation  Cardiovascular   Auscultation of heart: Normal rate and rhythm, normal S1 and S2, without murmurs  Examination of extremities for edema and/or varicosities: Normal     Carotid pulses: Normal     Lymphatic   Palpation of lymph nodes in neck: No lymphadenopathy      Psychiatric   Orientation to person, place, and time: Normal     Mood and affect: Normal         Right Upper Extremity: Normal ROM  Left Upper Extremity: Normal ROM  Right Lower Extremity: Normal ROM  Left Lower Extremity: Normal ROM  Musculoskeletal - Joints, bones, and muscles: Abnormal  Palpation - C5, C6 and C7 tenderness and bilateral knee crepitus  Right hand: All MCP, PIP and DIP joints are normal  Left Hand: All MCP, PIP and DIP joints are normal    The patient has tenderness in all MTP joints of the right foot  The patient has tenderness in all MTP joints of the left foot  Health Management  Tubulovillous adenoma polyp of colon   COLONOSCOPY; every 3 years; Last 85YIV1389; Next Due: 98ANJ4735; Active    Attending Note  Collaborating Physician: I did not interview and examine the patient, I discussed the case with the Advanced Practitioner and reviewed the note and I agree with the Advanced Practitioner note        Future Appointments    Date/Time Provider Specialty Site   11/20/2017 01:20 PM Tran Mo, [facility] Rhe[facility]ology [facility]     Signatures   Electronically signed by : Marilyn Adames [facility]; May 18 2017  2:20PM EST                       (Author)    Electronically signed by : Alvino Edwards DO; May 18 2017  2:25PM EST                       (Author)    Electronically signed by : Marilyn Adames [facility]; May 19 2017  9:17AM EST                       (Author)    Electronically signed by : Marilyn Adames [facility]; May 19 2017  9:19AM EST                       (Author)    Electronically signed by : Alvino Edwards DO; May 19 2017  5:17PM EST                       (Author)

## 2018-01-11 NOTE — RESULT NOTES
Message   Please inform the patient that the biopsies from her previous polypectomy site were normal  There is no evidence of any ongoing polyp there  This is great news  I recommend a repeat colonoscopy in 3 years  The second polyp removed was also a hyperplastic polyp, please put in for recall  Verified Results  COLONOSCOPY 94PLG0499 02:51PM [facility] [place]     Test Name Result Flag Reference   Colonoscopy 03/06/2017        Summary / No summary entered :      No summary entered   Documents attached :      EPIC OP NOTE - Sherri Shannon: [id number] - Appointment - Radha Bee (Gastroenterology Adult) (Additional Information Document)  (1) TISSUE EXAM [id number] 10:25AM [facility] [place]     Test Name Result Flag Reference   LAB AP CASE REPORT (Report)     Surgical Pathology Report             Case: [id number]                   Authorizing Provider: Adebayo Casanova MD      Collected:      03/06/2017 1025        Ordering Location:   [facility]    Received:      03/06/2017 [address] [facility]                               Pathologist:      Leena Buneo MD                              Specimens:  A) - Large Intestine, Sigmoid Colon, Cold bx previous scarred polypectomy site,            previous high grade dysplasia                                     B) - Large Intestine, Sigmoid Colon, Cold Bx Sigmoid colon polyp   LAB AP FINAL DIAGNOSIS (Report)     A  Sigmoid colon, previous polypectomy site:    - Histologic changes compatible with prior polypectomy     - No dysplasia or neoplasia identified  B  Sigmoid colon, polyp:    - Hyperplastic polyp     - No dysplasia or carcinoma identified  Electronically signed by Leena Bueno MD on 3/7/2017 at 10:24 AM   LAB AP SURGICAL ADDITIONAL INFORMATION (Report)     These tests were developed and their performance characteristics   determined by [facility] or [facility]   They may not be cleared or approved by the [place]  Food and   Drug Administration  The FDA has determined that such clearance or   approval is not necessary  These tests are used for clinical purposes  They should not be regarded as investigational or for research  This   laboratory has been approved by [place] , designated as a high-complexity   laboratory and is qualified to perform these tests  - Interpretation performed at [facility], [address] [place] [id number]   LAB AP GROSS DESCRIPTION (Report)     A  The specimen is received in formalin, labeled with the patient's name   and hospital number, and is designated sigmoid colon biopsy previous   scarred polypectomy site, previous high-grade dysplasia  The specimen   consists of multiple tan soft tissue fragments measuring in aggregate 0 5   x 0 4 x 0 1 cm  Entirely submitted  One cassette  B  The specimen is received in formalin, labeled with the patient's name   and hospital number, and is designated biopsy sigmoid colon polyp  The   specimen consists of 2 tan soft tissue fragments measuring 0 4 and 0 5 cm  Entirely submitted  One cassette  Note: The estimated total formalin fixation time based upon information   provided by the submitting clinician and the standard processing schedule   is 18 0 hours  [facility]   LAB AP CLINICAL INFORMATION      Previous High grade dysplasia   Previous High grade dysplasia       Plan  Colon cancer screening    · COLONOSCOPY ; every 3 years;  Last 52NJS1851; Status:Active

## 2018-01-12 VITALS
DIASTOLIC BLOOD PRESSURE: 84 MMHG | HEART RATE: 66 BPM | WEIGHT: 161.13 LBS | SYSTOLIC BLOOD PRESSURE: 120 MMHG | BODY MASS INDEX: 30.44 KG/M2

## 2018-01-12 VITALS — SYSTOLIC BLOOD PRESSURE: 124 MMHG | HEIGHT: 61 IN | HEART RATE: 68 BPM | DIASTOLIC BLOOD PRESSURE: 82 MMHG

## 2018-01-12 VITALS
HEART RATE: 68 BPM | BODY MASS INDEX: 30.49 KG/M2 | DIASTOLIC BLOOD PRESSURE: 80 MMHG | SYSTOLIC BLOOD PRESSURE: 132 MMHG | WEIGHT: 161.38 LBS

## 2018-01-12 NOTE — MISCELLANEOUS
Message  Patient called and cancelled colonoscopy due to illness  Will call back to schedule  Active Problems    1  Anxiety (300 00) (F41 9)   2  Arthropathy of cervical spine (716 98) (M46 92)   3  Cervical radicular pain (723 4) (M54 12)   4  Cervical spondylosis (721 0) (M47 812)   5  Colon cancer screening (V76 51) (Z12 11)   6  Depression (311) (F32 9)   7  Diabetes mellitus (250 00) (E11 9)   8  Drug-induced thrombocytopenia (287 49,E980 5) (D69 59)   9  Encounter for monitoring rituximab therapy (V58 83,V58 69) (Z51 81,Z79 899)   10  Glaucoma (365 9) (H40 9)   11  Hepatitis B antibody positive (795 79) (R76 8)   12  History of rheumatoid arthritis (V13 4) (Z87 39)   13  Hyperlipidemia (272 4) (E78 5)   14  Hypertension (401 9) (I10)   15  Lung nodules (793 19) (R91 8)   16  Mediterranean anemia (282 40) (D56 9)   17  Myofascial pain (729 1) (M79 1)   18  Neck pain (723 1) (M54 2)   19  Rheumatoid arthritis of multiple sites with negative rheumatoid factor (714 0) (M06 09)   20  Skin cancer, basal cell (173 91) (C44 91)   21  Thrombocytopenia (287 5) (D69 6)   22  Transaminitis (790 4) (R74 0)    Current Meds   1  Allegra 180 MG TABS (Fexofenadine HCl); TAKE 1 TABLET DAILY; Therapy: (Recorded:91Idh5606) to Recorded   2  Atorvastatin Calcium 20 MG Oral Tablet; TAKE 1 TABLET DAILY AT BEDTIME; Therapy: 38Div2697 to (Evaluate:02Npd5866) Recorded   3  Folic Acid 1 MG Oral Tablet; TAKE 1 TABLET DAILY; Therapy: (Recorded:82Wqu8562) to Recorded   4  Lexapro 20 MG Oral Tablet (Escitalopram Oxalate); TAKE 1 TABLET DAILY; Therapy: 04Apr2015 to (Evaluate:21Jun2015) Recorded   5  Lumigan 0 01 % Ophthalmic Solution; INSTILL 1 DROP INTO BOTH EYES ONCE DAILY   IN THE EVENING; Therapy: 42NYK1467 to (Evaluate:51Vom1025) Recorded   6  MoviPrep 100 GM Oral Solution Reconstituted; USE AS DIRECTED; Therapy: 07DWE6429 to (Last Rx:93Trp0877)  Requested for: 62ZYR7710 Ordered   7   Nabumetone 750 MG Oral Tablet; TAKE 1 TABLET Twice daily PRN PAIN WITH FOOD; Therapy: 35Bvy0994 to (Evaluate:18Jun2016)  Requested for: 22Ikf8875; Last   Rx:94Myr2371 Ordered   8  Rituxan 10 MG/ML CONC; 1000mg IV on day 0 and 14 at infusion center; Therapy: 11Gsj4618 to (Evaluate:08Sep2015); Last Rx:67Cwg8125 Ordered   9  Vitamin B12 TABS; TAKE 1 TABLET DAILY AS DIRECTED; Therapy: (Recorded:22May2015) to Recorded   10  Vitamin C CAPS; TAKE 1 CAPSULE DAILY; Therapy: (Recorded:22May2015) to Recorded   11  Vitamin D3 CAPS; take 1 capsule daily; Therapy: (Recorded:76Gpv8259) to Recorded    Allergies    1   No Known Drug Allergies    Signatures   Electronically signed by : Keyla Ying, ; Apr 4 2016 10:23AM EST                       (Author)

## 2018-01-13 VITALS
BODY MASS INDEX: 30.78 KG/M2 | DIASTOLIC BLOOD PRESSURE: 82 MMHG | HEIGHT: 61 IN | WEIGHT: 163 LBS | SYSTOLIC BLOOD PRESSURE: 136 MMHG | HEART RATE: 64 BPM

## 2018-01-13 NOTE — RESULT NOTES
Message  As you recall you had a polyp removed during your recent colonoscopy  This turned out to be an aggressive polyp with high-grade dysplasia in the setting of a tubulovillous adenoma  Because this is considered a very aggressive polyp, I would recommend a repeat colonoscopy in 3 months to make sure that the entire polyp has been removed, after that we would likely need to have you back for another colonoscopy in one year  Please call with any questions or concerns        Signatures   Electronically signed by : MICHAELLE Gallego ; Sep 13 2016 12:03PM EST                       (Author)

## 2018-01-14 NOTE — RESULT NOTES
Message   I spoke with the patient, discussed results of biopsies  She has high grade dysplasia within a tubular adenoma  She needs a repeat colonoscopy in 6 months to make sure that the entire polyp has been removed  Please schedule with a 2 day bowel prep, magnesium site trait first night, followed by Dulcolax Zari lax as usual the day before the procedure  Verified Results  (1) TISSUE EXAM 07Sep2016 10:15AM Karlis Picking     Test Name Result Flag Reference   LAB AP CASE REPORT (Report)     Surgical Pathology Report             Case: [id number]                   Authorizing Provider: Jessenia Manning MD      Collected:      09/07/2016 1015        Ordering Location:   [facility]    Received:      09/07/2016 [address] [facility]                               Pathologist:      Radha Britt MD                              Specimen:  Polyp, Colorectal, Sigmoid   LAB AP FINAL DIAGNOSIS (Report)     A  Sigmoid colon:  - High grade dysplasia is present in several portions of tubulovillous   adenoma; the adenoma is present in each submitted tissue portion  Note: Intradepartmental consultation concurs with the diagnosis of high   grade dysplasia  Final report is faxed by [facility] to Dr Yocasta Rhodes   12:30 PM, 9/9/2016  Interpretation performed at [facility], [address] [address]  Electronically signed by Radha Britt MD on 9/9/2016 at 12:29 PM   LAB AP SURGICAL ADDITIONAL INFORMATION (Report)     These tests were developed and their performance characteristics   determined by [facility] or [facility]  They may not be cleared or approved by the [place]  Food and   Drug Administration  The FDA has determined that such clearance or   approval is not necessary  These tests are used for clinical purposes  They should not be regarded as investigational or for research   This   laboratory has been approved by CLIA 88, designated as a high-complexity   laboratory and is qualified to perform these tests  LAB AP GROSS DESCRIPTION (Report)     A  The specimen is received in formalin, labeled with the patient's name   and hospital number, and is designated sigmoid, are multiple irregularly   shaped fragments of tan soft tissue measuring 1 7 x 0 5 x 0 1 cm in   aggregate  Entirely submitted  One cassette  Note: The estimated total formalin fixation time based upon information   provided by the submitting clinician and the standard processing schedule   is 18 25 hours  RLR

## 2018-01-14 NOTE — PROGRESS NOTES
Assessment    1  Rheumatoid arthritis of multiple sites with negative rheumatoid factor (714 0) (M06 09)   2  Encounter for monitoring rituximab therapy (V58 83,V58 69) (Z51 81,Z79 899)   3  Spondylosis of cervical region without myelopathy or radiculopathy (721 0) (M47 812)   4  Neck pain (723 1) (M54 2)   5  Hypertension (401 9) (I10)   6  Anxiety (300 00) (F41 9)    Plan    1  (1) CBC/PLT/DIFF; Status:Active; Requested for:11Oct2016;    2  (1) COMPREHENSIVE METABOLIC PANEL; Status:Active; Requested for:11Oct2016;    3  (1) C-REACTIVE PROTEIN; Status:Active; Requested for:11Oct2016;    4  (1) SED RATE; Status:Active; Requested for:11Oct2016;    5  Follow-up visit in 3 months Evaluation and Treatment  Follow-up  Status: Hold For -   Scheduling  Requested for: 50MIB8060    6  Humira Pen 40 MG/0 8ML Subcutaneous Pen-injector Kit; Inject 40 mg SC   every other week   7  Call [phone] if: The pain seems worse ; Status:Complete;   Done: 78IFN3191   9  Call [phone] if: The symptoms seem worse ; Status:Complete;   Done:   67EYI6184   7  Call [phone] if: You have questions or concerns about your problem ;   Status:Complete;   Done: 07JSN0949    Discussion/Summary    This is a 52-year-old female presenting today for follow-up for rheumatoid arthritis  The patient states that she's been doing well since last appointment and since starting Humira  She states that she does not have much joint pain however has noticed increased fatigue and has felt feverish  She states she was sick 2 weeks ago when she is uncertain whether the symptoms are related to a virus or from Humira  She does plan to hold her next dose of Humira and see how she feels  In addition she did have trigger point injections with cortisone last week and she's not sure of this may be a side effect from cortisone  She states that she does have some discomfort in her left knee as well as her right hand   She also notices numbness of the right hand  She did have an MRI of the cervical spine which was negative  She was told that the majority of her pain and discomfort is from spasms  The patient states that she has no obvious joint swelling at this time he denies morning stiffness  She has difficulty with sleep and describes nonrestorative sleep as well as fatigue  She's not currently taking any over-the-counter medication for pain at this time  On physical exam, there is no synovitis  Patient does have tenderness of her left knee as well as crepitus of bilateral knees  Patient's labs including a CBC, CMP, and CRP are within normal range  At this time patient's history and physical examination is most consistent with rheumatoid arthritis which appears to be stable with Humira at this time  Patient will hold her next dose of Humira and determine whether her symptoms continue  The patient continues to have symptoms of feeling fatigued or feverish we will consider further biologic medication including Actemra or Xeljanx  We will have patient return to the office in 3 months time and repeat a CBC, CMP, CRP, and ESR before that appointment  She will call in the interim if she has any further questions or concerns  The patient was counseled regarding diagnostic results, instructions for management, prognosis, patient and family education, risks and benefits of treatment options, importance of compliance with treatment  Counseling  Rheumatology Counseling Documentation:   I recommended the patient receive the following vaccines: influenza  She agreed to the influenza vaccine(s)  She will contact her PCP for the vaccine(s)  Chief Complaint  F/U RA   Patient is here today for follow up of chronic conditions described in HPI  History of Present Illness  Patient is in the office today for follow up for RA  Feeling alright at this time  Started Humira with relief of pain but feels tired, feverish and with the sweats   Plan to skip a dose and see how she feels  Some discomfort in the left leg occasionally and some right hand pain and numbness  Had MRI cervical spine and had TPIs with steroids so not sure if that is causing her symptoms  No obvious joint swelling  No Am stiffness  +Difficulty with sleep  +Non restorative sleep  +Fatigue  No OTC medication for pain at this time  RAPID3: 9 0 /30      Review of Systems    Constitutional: +sweats and fatigue, but no fever, no recent weight gain, no chills, no recent weight loss and no anorexia  HEENT: dryness of the eyes and erythema eye(s), but no blurred vision, no double vision, no amaurosis fugax, no eye pain, no dryness mouth, no mouth sores, not feeling congested and no sore throat  Cardiovascular: no chest pain or pressure, no dyspnea on exertion and no swelling in the arms or legs  Respiratory: no shortness of breath and no pleurisy    The patient presents with complaints of unusual or persistent cough, described as dry  Gastrointestinal: nausea, heartburn and constipation, but no abdominal pain, no vomiting, no diarrhea, no melena and no BRBPR  Genitourinary: No foamy urine, but no dysuria and no hematuria  Musculoskeletal: as noted in HPI  Integumentary no rash, no Raynaud's, no alopecia, no nail changes and no photosensitivity  Endocrine no polyuria and no polydipsia  Hematologic/Lymphatic: no unusual bleeding    The patient presents with complaints of a tendency for easy bruising (since Humira injection at site)  Neurological: headache and tingling, but no weakness    The patient presents with complaints of vertigo or dizziness, described as lightheadedness  Symptoms are made worse by getting up quickly  Psychiatric: insomnia and non-restorative sleep  Active Problems    1  Anxiety (300 00) (F41 9)   2  Arthropathy of cervical spine (716 98) (M46 92)   3  Cervical radicular pain (723 4) (M54 12)   4  Colon cancer screening (V76 51) (Z12 11)   5  Depression (311) (F32 9)   6  Diabetes mellitus (250 00) (E11 9)   7  Drug-induced thrombocytopenia (287 49,E980 5) (D69 59)   8  Encounter for monitoring rituximab therapy (V58 83,V58 69) (Z51 81,Z79 899)   9  Glaucoma (365 9) (H40 9)   10  Hepatitis B antibody positive (795 79) (R76 8)   11  History of rheumatoid arthritis (V13 4) (Z87 39)   12  Hyperlipidemia (272 4) (E78 5)   13  Hypertension (401 9) (I10)   14  Hypoestrogenism (256 39) (E28 39)   15  Lung nodules (793 19) (R91 8)   16  Mediterranean anemia (282 40) (D56 9)   17  Myofascial pain (729 1) (M79 1)   18  Neck pain (723 1) (M54 2)   19  Rheumatoid arthritis of multiple sites with negative rheumatoid factor (714 0) (M06 09)   20  Skin cancer, basal cell (173 91) (C44 91)   21  Spondylosis of cervical region without myelopathy or radiculopathy (721 0) (M47 812)   22  Thrombocytopenia (287 5) (D69 6)   23  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)   24  Visit for routine gyn exam (V72 31) (Z01 419)    Past Medical History    1  Anxiety (300 00) (F41 9)   2  Depression (311) (F32 9)   3  Glaucoma (365 9) (H40 9)   4  History of hepatitis B virus infection (V12 09) (Z86 19)   5  History of rheumatoid arthritis (V13 4) (Z87 39)   6  Hyperlipidemia (272 4) (E78 5)   7  Hypertension (401 9) (I10)   8  Lung nodules (793 19) (R91 8)   9  Mediterranean anemia (282 40) (D56 9)   10  History of On abatacept therapy (V58 69) (Z79 899)   11  History of Seronegative rheumatoid arthritis (714 0) (M06 00)   12  Thrombocytopenia (287 5) (D69 6)   13  History of Transaminitis (790 4) (R74 0)    The active problems and past medical history were reviewed and updated today  Surgical History    1  History of Cholecystectomy Laparoscopic   2  History of Foot Surgery   3  History of Hemorrhoidectomy   4  History of Tubal Ligation    The surgical history was reviewed and updated today  Family History  Mother    1  Denied: Family history of Colon cancer   2   Denied: Family history of Crohn's disease without complication, unspecified   gastrointestinal tract location   3  Family history of hypertension (V17 49) (Z82 49)   4  Denied: Family history of liver disease   5  Family history of malignant neoplasm of breast (V16 3) (Z80 3)   6  Family history of osteoporosis (V17 81) (Z82 62)  Father    7  Family history of CAD (coronary artery disease)   8  Denied: Family history of Colon cancer   9  Denied: Family history of Crohn's disease without complication, unspecified   gastrointestinal tract location   10  Family history of diabetes mellitus (V18 0) (Z83 3)   11  Denied: Family history of liver disease  Family History    12  Denied: Family history of Crohn's disease   15  Family history of malignant neoplasm of breast (V16 3) (Z80 3)   14  Denied: Family history of psoriasis   15  Denied: Family history of rheumatoid arthritis   16  Denied: Family history of systemic lupus erythematosus   17  Denied: Family history of ulcerative colitis   18  Family history of Hypertension, benign    The family history was reviewed and updated today  Social History    · Former smoker (M47 07) (C10 653)   · No alcohol use   · No drug use  The social history was reviewed and updated today  The social history was reviewed and is unchanged  Current Meds   1  Allegra 180 MG TABS; TAKE 1 TABLET DAILY; Therapy: (Recorded:23Vom7813) to Recorded   2  Atorvastatin Calcium 20 MG Oral Tablet; TAKE 1 TABLET DAILY AT BEDTIME; Therapy: 16Apr2015 to (Evaluate:66Yyo5811) Recorded   3  Folic Acid 1 MG Oral Tablet; TAKE 1 TABLET DAILY; Therapy: (Recorded:92Aeo7273) to Recorded   4  Humira Pen 40 MG/0 8ML Subcutaneous Pen-injector Kit; Inject 40 mg SC every other   week; Therapy: 87VMW8470 to (Evaluate:10Aug2016)  Requested for: 86TBI7363; Last   Rx:61Pxg3057 Ordered   5  Hydrochlorothiazide 12 5 MG Oral Capsule; Therapy: (Recorded:09Bms7615) to Recorded   6   Lexapro 20 MG Oral Tablet; TAKE 1 TABLET DAILY; Therapy: 04Apr2015 to (Evaluate:21Jun2015) Recorded   7  Lumigan 0 01 % Ophthalmic Solution; INSTILL 1 DROP INTO BOTH EYES ONCE DAILY   IN THE EVENING; Therapy: 67VKQ3166 to (Evaluate:20Aug2015) Recorded   8  TiZANidine HCl - 4 MG Oral Tablet; TAKE 1/2 TO 1 TABLET 3 TIMES DAILY AS NEEDED    Requested for: 11Apr2016; Last Rx:11Apr2016 Ordered   9  Vitamin D3 CAPS; take 1 capsule daily; Therapy: (Recorded:22May2015) to Recorded    The medication list was reviewed and updated today  Allergies    1  No Known Drug Allergies    Vitals  Signs   Recorded: 35ZJT8836 74:70XN   Systolic: 190  Diastolic: 68  Heart Rate: 64  Weight: 157 lb   BMI Calculated: 29 67  BSA Calculated: 1 7    Physical Exam    Constitutional   General appearance: Abnormal   overweight  Eyes   Conjunctiva and lids: No swelling, erythema or discharge  Pupils and irises: Equal, round and reactive to light  Ears, Nose, Mouth, and Throat   External inspection of ears and nose: Normal     Oropharynx: Normal with no erythema, edema, exudate lesions, or ulcers  Pulmonary   Respiratory effort: No increased work of breathing or signs of respiratory distress  Auscultation of lungs: Clear to auscultation  Cardiovascular   Auscultation of heart: Normal rate and rhythm, normal S1 and S2, without murmurs  Examination of extremities for edema and/or varicosities: Normal     Lymphatic   Palpation of lymph nodes in neck: No lymphadenopathy  Psychiatric   Orientation to person, place, and time: Normal     Mood and affect: Normal         Left knee tenderness  Musculoskeletal - Joints, bones, and muscles: Abnormal  Palpation - C5, C6 and C7 tenderness and bilateral knee crepitus       Right hand: All MCP, PIP and DIP joints are normal  Left Hand: All MCP, PIP and DIP joints are normal    Right foot: All MTP, PIP and DIP joints are normal  Left foot: All MTP, PIP and DIP joints are normal       Results/Data  (1) CBC/PLT/DIFF 75FXP6306 01: 35PM Claire Be Order Number: [id number]     Test Name Result Flag Reference   WBC COUNT 5 47 Thousand/uL  4 31-10 16   RBC COUNT 5 61 Million/uL H 3 81-5 12   HEMOGLOBIN 11 9 g/dL  11 5-15 4   HEMATOCRIT 35 6 %  34 8-46  1   MCV 64 fL L 82-98   MCH 21 2 pg L 26 8-34 3   MCHC 33 4 g/dL  31 4-37 4   RDW 15 8 % H 11 6-15 1   PLATELET COUNT 437 Thousands/uL L 149-390   Manual Review of Smear Performed   NEUTROPHILS RELATIVE PERCENT 47 %  43-75   LYMPHOCYTES RELATIVE PERCENT 40 %  14-44   MONOCYTES RELATIVE PERCENT 12 %  4-12   EOSINOPHILS RELATIVE PERCENT 1 %  0-6   BASOPHILS RELATIVE PERCENT 0 %  0-1   NEUTROPHILS ABSOLUTE COUNT 2 57 Thousands/?L  1 85-7 62   LYMPHOCYTES ABSOLUTE COUNT 2 18 Thousands/?L  0 60-4 47   MONOCYTES ABSOLUTE COUNT 0 63 Thousand/?L  0 17-1 22   EOSINOPHILS ABSOLUTE COUNT 0 07 Thousand/?L  0 00-0 61   BASOPHILS ABSOLUTE COUNT 0 02 Thousands/?L  0 00-0 10     (1) COMPREHENSIVE METABOLIC PANEL 99FDL1207 60:13NQ Ana Read    Order Number: [id number]     Test Name Result Flag Reference   GLUCOSE,RANDM 95 mg/dL     If the patient is fasting, the ADA then defines impaired fasting glucose as > 100 mg/dL and diabetes as > or equal to 123 mg/dL     SODIUM 143 mmol/L  136-145   POTASSIUM 3 7 mmol/L  3 5-5 3   CHLORIDE 105 mmol/L  100-108   CARBON DIOXIDE 31 mmol/L  21-32   ANION GAP (CALC) 7 mmol/L  4-13   BLOOD UREA NITROGEN 16 mg/dL  5-25   CREATININE 0 77 mg/dL  0 60-1 30   Standardized to IDMS reference method   CALCIUM 8 9 mg/dL  8 3-10 1   BILI, TOTAL 0 80 mg/dL  0 20-1 00   ALK PHOSPHATAS 65 U/L     ALT (SGPT) 37 U/L  12-78   AST(SGOT) 27 U/L  5-45   ALBUMIN 3 9 g/dL  3 5-5 0   TOTAL PROTEIN 7 0 g/dL  6 4-8 2   eGFR Non-African American      >60 0 ml/min/1 73sq [facility] Disease Education Program recommendations are as follows:  GFR calculation is accurate only with a steady state creatinine  Chronic Kidney disease less than 60 ml/min/1 73 sq  meters  Kidney failure less than 15 ml/min/1 73 sq  meters  (1) C-REACTIVE PROTEIN 81ZGQ2144 01:35PM Saida Alcantara    Order Number: [id number]     Test Name Result Flag Reference   C-REACT PROTEIN <3 0 mg/L  <3 0       Health Management  Colon cancer screening   COLONOSCOPY; every 3 years; Last 07Sep2016; Next Due: 07Sep2019; Active    Attending Note  Collaborating Physician: I did not interview and examine the patient, I discussed the case with the Advanced Practitioner and reviewed the note and I agree with the Advanced Practitioner note  Future Appointments    Date/Time Provider Specialty Site   03/06/2017 08:30 AM MICHAELLE Orta   [facility]ology [facility] OUTPATIENT   01/12/2017 01:00 PM Kaykay Rojasma [facility]  [address] [facility]     Signatures   Electronically signed by : JOSE Card; Oct 11 2016  1:37PM EST                       (Author)    Electronically signed by : Allison Kan DO; Oct 11 2016  2:08PM EST                       (Author)

## 2018-01-14 NOTE — MISCELLANEOUS
Message  Left a message for patient to call us back in regards to her latest CBC  We will consider holding Humira for a dose and rechecking in a month due to low white count and platelets        Signatures   Electronically signed by : JOSE Canales; Mar  2 2017  9:09AM EST                       (Author)

## 2018-01-17 NOTE — MISCELLANEOUS
Message  Spoke to patient regards to her recent lab work specifically her CBC  Patient is not currently utilizing Humira or any other immunosuppressive medication  Patient's blood work did reveal thrombocytopenia as well as leukopenia  Patient will be referred to hematology for further evaluation  We will mail out a referral slip to the patient and she is to call to schedule  Patient voiced understanding and thanked me      Plan  Thrombocytopenia    · *1 - [facility] Physician Referral  Consult Only: the expectation is  that the referring provider will communicate back to the patient on treatment options  Evaluation and Treatment: the expectation is that the referred to provider will  communicate back to the patient on treatment options  Please evaluate and treat for leukopenia and thrombopenia, not currently on Humira or  any immunosuppressive medications at this time  Thank you    Status: Hold For -  Scheduling  Requested for: 37IMV5167  Care Summary provided  : Yes    Signatures   Electronically signed by : JOSE Garcia; Mar  2 2017  2:11PM EST                       (Author)

## 2018-01-22 VITALS
HEART RATE: 62 BPM | HEIGHT: 61 IN | BODY MASS INDEX: 32.66 KG/M2 | DIASTOLIC BLOOD PRESSURE: 88 MMHG | SYSTOLIC BLOOD PRESSURE: 138 MMHG | WEIGHT: 173 LBS

## 2018-01-23 NOTE — PROGRESS NOTES
Assessment    1  Rheumatoid arthritis of multiple sites with negative rheumatoid factor (714 0) (M06 09)   2  Long-term use of immunosuppressant medication (V58 69) (Z79 899)   3  Spondylosis of cervical region without myelopathy or radiculopathy (721 0) (M47 812)   4  Chronic ITP (idiopathic thrombocytopenia) (287 31) (D69 3)   5  Mediterranean anemia (282 40) (D56 9)   6  Hypertension (401 9) (I10)   7  Diabetes mellitus (250 00) (E11 9)   8  Depression (311) (F32 9)    Plan    1  (1) CBC/PLT/DIFF; Status:Active; Requested for:53Elv4670;    2  (1) COMPREHENSIVE METABOLIC PANEL; Status:Active; Requested for:36Iqr0934;    3  (1) C-REACTIVE PROTEIN; Status:Active; Requested for:75Hrk2173;    4  (1) SED RATE; Status:Active; Requested for:87Cyp7886;    5  Call [phone] if: The pain seems worse ; Status:Complete;   Done: 50YLP3863   6  Call [phone] if: The symptoms seem worse ; Status:Complete;   Done:   32PBL3583   7  Call [phone] if: You have questions or concerns about your problem ;   Status:Complete;   Done: 47VKO1873   8  Follow-up visit in 3 months Evaluation and Treatment  Follow-up  Status: Complete    Done: 52WJZ3769    Discussion/Summary    This is a 61-year-old female presenting today for follow-up for rheumatoid arthritis  The patient states that she is currently off of Reggie Mcardle as she did have a severe infection in her mouth from herpes simplex as well as an infection in the high  She states that she continues to have pain in her right wrist as well as bilateral knees and ankles  She notes swelling in the right wrist as well as the hand  She denies any further obvious joint swelling but does describe difficulty with sleep due to joint discomfort  She notes non restorative sleep as well as fatigue  She currently denies morning stiffness   She states that she has tried several biologics as well as methotrexate and Plaquenil in the past  She states that most of immunosuppressive medication has caused her to have increased amounts of infections  On physical examination, there is no active synovitis  She has tenderness of the MCPs and PIP is bilaterally as well as the right wrist and bilateral shoulders  There is tenderness of both hips, knees, and ankles  There is crepitus of the knees  Patient's most recent labs reveal an elevated red blood cell count otherwise her CBC, CMP, CRP, ESR within normal range  At this time patient's history and physical examination is most consistent with rheumatoid arthritis which appears to be mildly active but stable off of treatment at this time  Patient was given the option of trying leflunomide however she states that she would like to remain off of medication at this time  She will plan to return to the office in 3 months time for further evaluation we will obtain a CBC, CMP, CRP, ESR before that follow-up  She will contact the office in the interim if she has any further questions or concerns  The patient has the current Goals: Improve joint pain  The patent has the current Barriers: Frequent infections  Patient is able to Self-Care  Counseling  Rheumatology Counseling Documentation: The patient was counseled regarding diagnostic results, instructions for management, prognosis, patient and family education, risks and benefits of treatment options and importance of compliance with treatment  Chief Complaint  F/U RA   Patient is here today for follow up of chronic conditions described in HPI  History of Present Illness  Patient is in the office today for follow up for RA  Off Xeljanx now due to two infections  Pain in the knees, ankles, and right wrists  +Some swelling in the right wrist and hand  No other obvious joint swelling  No Am stiffness  +Difficulty with sleep  +Non restorative sleep  +fatigue   Tried MTX and HCQ in the past      RAPID3: 16 5 /30      Review of Systems    Constitutional: recent 10 lb weight gain and fatigue, but no fever, no chills, no recent weight loss and no anorexia  HEENT: dryness of the eyes and dryness mouth, but no blurred vision, no double vision, no amaurosis fugax, not feeling congested and no sore throat    The patient presents with complaints of mouth sores (mouth )  Cardiovascular: no chest pain or pressure and no dyspnea on exertion  Respiratory: no shortness of breath and no pleurisy    The patient presents with complaints of unusual or persistent cough, described as dry  Gastrointestinal: nausea, heartburn and constipation, but no abdominal pain, no vomiting, no diarrhea and no melena  Genitourinary: No foamy urine, but no dysuria and no hematuria  Musculoskeletal: as noted in HPI  Integumentary rash and alopecia, but no nail changes  Endocrine no polyuria and no polydipsia  Hematologic/Lymphatic: a tendency for easy bruising, but no unusual bleeding  Neurological: headache, tingling and weakness, but no vertigo or dizziness  Psychiatric: insomnia and non-restorative sleep  Active Problems    1  Anxiety (300 00) (F41 9)   2  Arthropathy of cervical spine (716 98) (M46 92)   3  Cervical radicular pain (723 4) (M54 12)   4  Chronic ITP (idiopathic thrombocytopenia) (287 31) (D69 3)   5  Colon cancer screening (V76 51) (Z12 11)   6  Depression (311) (F32 9)   7  Diabetes mellitus (250 00) (E11 9)   8  Glaucoma (365 9) (H40 9)   9  History of rheumatoid arthritis (V13 4) (Z87 39)   10  Hyperlipidemia (272 4) (E78 5)   11  Hypertension (401 9) (I10)   12  Hypoestrogenism (256 39) (E28 39)   13  Long-term use of immunosuppressant medication (V58 69) (Z79 899)   14  Lung nodules (793 19) (R91 8)   15  Mediterranean anemia (282 40) (D56 9)   16  Myofascial pain (729 1) (M79 1)   17  Neck pain (723 1) (M54 2)   18  Rheumatoid arthritis of multiple sites with negative rheumatoid factor (714 0) (M06 09)   19  Skin cancer, basal cell (173 91) (C44 91)   20   Spondylosis of cervical region without myelopathy or radiculopathy (721 0) (M47 812)   21  Thrombocytopenia (287 5) (D69 6)   22  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)    Past Medical History    1  Anxiety (300 00) (F41 9)   2  Depression (311) (F32 9)   3  History of Drug-induced thrombocytopenia (287 49,E947 9) (D69 59,T50 905A)   4  History of Encounter for monitoring rituximab therapy (V58 83,V58 69) (Z51 81,Z79 899)   5  Glaucoma (365 9) (H40 9)   6  History of Hepatitis B antibody positive (795 79) (R76 8)   7  History of hepatitis B virus infection (V12 09) (Z86 19)   8  History of ITP (V12 3) (Z86 2)   9  History of rheumatoid arthritis (V13 4) (Z87 39)   10  Hyperlipidemia (272 4) (E78 5)   11  Hypertension (401 9) (I10)   12  Lung nodules (793 19) (R91 8)   13  Mediterranean anemia (282 40) (D56 9)   14  History of On abatacept therapy (V58 69) (Z79 899)   15  History of Seronegative rheumatoid arthritis (714 0) (M06 00)   16  Thrombocytopenia (287 5) (D69 6)   17  History of Transaminitis (790 4) (R74 0)   18  History of Visit for routine gyn exam (V72 31) (Z01 419)    The active problems and past medical history were reviewed and updated today  Surgical History    1  History of Cholecystectomy Laparoscopic   2  History of Foot Surgery   3  History of Hemorrhoidectomy   4  History of Tubal Ligation    The surgical history was reviewed and updated today  Family History  Mother    1  Denied: Family history of Colon cancer   2  Denied: Family history of Crohn's disease without complication, unspecified   gastrointestinal tract location   3  Family history of hypertension (V17 49) (Z82 49)   4  Denied: Family history of liver disease   5  Family history of malignant neoplasm of breast (V16 3) (Z80 3)   6  Family history of osteoporosis (V17 81) (Z82 62)  Father    7  Family history of CAD (coronary artery disease)   8  Denied: Family history of Colon cancer   9   Denied: Family history of Crohn's disease without complication, unspecified   gastrointestinal tract location   10  Family history of diabetes mellitus (V18 0) (Z83 3)   11  Denied: Family history of liver disease  Family History    12  Denied: Family history of Crohn's disease   15  Family history of malignant neoplasm of breast (V16 3) (Z80 3)   14  Denied: Family history of psoriasis   15  Denied: Family history of rheumatoid arthritis   16  Denied: Family history of systemic lupus erythematosus   17  Denied: Family history of ulcerative colitis   18  Family history of Hypertension, benign    The family history was reviewed and updated today  Social History    · Former smoker (Y14 17) (P48 093)   · No alcohol use   · No drug use  The social history was reviewed and updated today  The social history was reviewed and is unchanged  Current Meds   1  Allegra 180 MG TABS; TAKE 1 TABLET DAILY; Therapy: (Recorded:99Lfp5544) to Recorded   2  Atorvastatin Calcium 20 MG Oral Tablet; TAKE 1 TABLET DAILY AT BEDTIME; Therapy: 88Qwx0998 to (Evaluate:14Esm0605) Recorded   3  Lexapro 20 MG Oral Tablet; TAKE 1 TABLET DAILY; Therapy: 72Rsx4412 to (Evaluate:86Mbc2497) Recorded   4  Lumigan 0 01 % Ophthalmic Solution; INSTILL 1 DROP INTO BOTH EYES ONCE DAILY   IN THE EVENING; Therapy: 84OTR7309 to (Evaluate:18Zte5614) Recorded   5  Vitamin D3 CAPS; take 1 capsule daily; Therapy: (Recorded:29Pxn8275) to Recorded   6  Zolpidem Tartrate 5 MG Oral Tablet; TAKE 1 TABLET AT  BEDTIME AS NEEDED FOR   INSOMNIA; Therapy: (Recorded:38Pyc7042) to Recorded    The medication list was reviewed and updated today  Allergies    1  No Known Drug Allergies    Vitals  Signs   Recorded: 49AXU6874 01:05PM   Heart Rate: 62  Systolic: 690  Diastolic: 88  Height: 5 ft 1 in  Weight: 173 lb   BMI Calculated: 32 69  BSA Calculated: 1 78    Physical Exam    Constitutional   General appearance: Abnormal   overweight  Eyes   Conjunctiva and lids: No swelling, erythema or discharge  Pupils and irises: Equal, round and reactive to light  Ears, Nose, Mouth, and Throat   External inspection of ears and nose: Normal     Oropharynx: Normal with no erythema, edema, exudate lesions, or ulcers  Pulmonary   Respiratory effort: No increased work of breathing or signs of respiratory distress  Auscultation of lungs: Clear to auscultation  Cardiovascular   Auscultation of heart: Normal rate and rhythm, normal S1 and S2, without murmurs  Examination of extremities for edema and/or varicosities: Normal     Carotid pulses: Normal     Lymphatic   Palpation of lymph nodes in neck: No lymphadenopathy  Psychiatric   Orientation to person, place, and time: Normal     Mood and affect: Normal         Right glenohumeral joint tenderness  Left glenohumeral joint tenderness  Right ankle tenderness  Right hip tenderness  Left ankle tenderness  Left hip tenderness  Musculoskeletal - Joints, bones, and muscles: Abnormal  Palpation - bilateral knee crepitus  The patient has tenderness in all MCP and PIP joints of the right hand  The patient has tenderness in all MCP and PIP joints of the left hand  Right foot: All MTP, PIP and DIP joints are normal  Left foot: All MTP, PIP and DIP joints are normal       Results/Data  (1) CBC/PLT/DIFF 30HIZ7082 01:13PM Palomo Caba    Order Number: [id number]     Test Name Result Flag Reference   WBC COUNT 5 21 Thousand/uL  4 31-10 16   RBC COUNT 5 46 Million/uL H 3 81-5 12   HEMOGLOBIN 11 3 g/dL L 11 5-15 4   HEMATOCRIT 35 4 %  34 8-46  1   MCV 65 fL L 82-98   MCH 20 7 pg L 26 8-34 3   MCHC 31 9 g/dL  31 4-37 4   RDW 16 0 % H 11 6-15 1   PLATELET COUNT 509 Thousands/uL  149-390   NEUTROPHILS RELATIVE PERCENT 68 %  43-75   LYMPHOCYTES RELATIVE PERCENT 23 %  14-44   MONOCYTES RELATIVE PERCENT 8 %  4-12   EOSINOPHILS RELATIVE PERCENT 1 %  0-6   BASOPHILS RELATIVE PERCENT 0 %  0-1   NEUTROPHILS ABSOLUTE COUNT 3 51 Thousands/? ? ? L 1  85-7 62   LYMPHOCYTES ABSOLUTE COUNT 1 22 Thousands/? ??L  0 60-4 47   MONOCYTES ABSOLUTE COUNT 0 40 Thousand/? ??L  0 17-1 22   EOSINOPHILS ABSOLUTE COUNT 0 07 Thousand/? ??L  0 00-0 61   BASOPHILS ABSOLUTE COUNT 0 01 Thousands/? ??L  0 00-0 10     (1) COMPREHENSIVE METABOLIC PANEL 53FFQ9540 88:42EM Evette Dire TW Order Number: [id number]     Test Name Result Flag Reference   GLUCOSE,RANDM 176 mg/dL H    If the patient is fasting, the ADA then defines impaired fasting glucose as > 100 mg/dL and diabetes as > or equal to 123 mg/dL  Specimen collection should occur prior to Sulfasalazine administration due to the potential for falsely depressed results  Specimen collection should occur prior to Sulfapyridine administration due to the potential for falsely elevated results  SODIUM 143 mmol/L  136-145   POTASSIUM 3 8 mmol/L  3 5-5 3   CHLORIDE 106 mmol/L  100-108   CARBON DIOXIDE 28 mmol/L  21-32   ANION GAP (CALC) 9 mmol/L  4-13   BLOOD UREA NITROGEN 13 mg/dL  5-25   CREATININE 0 88 mg/dL  0 60-1 30   Standardized to IDMS reference method   CALCIUM 8 7 mg/dL  8 3-10 1   BILI, TOTAL 0 70 mg/dL  0 20-1 00   ALK PHOSPHATAS 82 U/L     ALT (SGPT) 37 U/L  12-78   Specimen collection should occur prior to Sulfasalazine administration due to the potential for falsely depressed results  AST(SGOT) 27 U/L  5-45   Specimen collection should occur prior to Sulfasalazine administration due to the potential for falsely depressed results  ALBUMIN 3 5 g/dL  3 5-5 0   TOTAL PROTEIN 6 7 g/dL  6 4-8 2   eGFR 71 ml/min/1 73sq m     National Kidney Disease Education Program recommendations are as follows:  GFR calculation is accurate only with a steady state creatinine  Chronic Kidney disease less than 60 ml/min/1 73 sq  meters  Kidney failure less than 15 ml/min/1 73 sq  meters       (1) C-REACTIVE PROTEIN 22Nov2017 01:13PM Kalieette Dire TW Order Number: [id number]     Test Name Result Flag Reference   C-REACT PROTEIN <3 0 mg/L  <3 0     (1) SED RATE 22Nov2017 01:13PM Aliene Staff   TW Order Number: [id number]     Test Name Result Flag Reference   SED RATE 6 mm/hour  0-20       Health Management  Tubulovillous adenoma polyp of colon   COLONOSCOPY; every 3 years; Last 08VEM8154; Next Due: 14OMI3656; Active    Future Appointments    Date/Time Provider Specialty Site   03/20/2018 01:00 PM Venu Escobar [facility] Rheumatology [facility]     Signatures   Electronically signed by : Charles Pittman [facility]; Dec 19 2017  1:30PM EST                       (Author)    Electronically signed by :  MICHAELLE Espinosa ; Dec 28 2017  1:12PM EST                       (Author)

## 2018-03-15 ENCOUNTER — APPOINTMENT (OUTPATIENT)
Dept: LAB | Facility: CLINIC | Age: 63
End: 2018-03-15
Payer: MEDICARE

## 2018-03-15 DIAGNOSIS — M06.09 RHEUMATOID ARTHRITIS OF MULTIPLE SITES WITHOUT RHEUMATOID FACTOR (HCC): ICD-10-CM

## 2018-03-15 DIAGNOSIS — Z79.899 OTHER LONG TERM (CURRENT) DRUG THERAPY: ICD-10-CM

## 2018-03-15 LAB
ALBUMIN SERPL BCP-MCNC: 3.8 G/DL (ref 3.5–5)
ALP SERPL-CCNC: 72 U/L (ref 46–116)
ALT SERPL W P-5'-P-CCNC: 41 U/L (ref 12–78)
ANION GAP SERPL CALCULATED.3IONS-SCNC: 7 MMOL/L (ref 4–13)
AST SERPL W P-5'-P-CCNC: 26 U/L (ref 5–45)
BASOPHILS # BLD AUTO: 0.01 THOUSANDS/ΜL (ref 0–0.1)
BASOPHILS NFR BLD AUTO: 0 % (ref 0–1)
BILIRUB SERPL-MCNC: 0.7 MG/DL (ref 0.2–1)
BUN SERPL-MCNC: 15 MG/DL (ref 5–25)
CALCIUM SERPL-MCNC: 8.5 MG/DL (ref 8.3–10.1)
CHLORIDE SERPL-SCNC: 105 MMOL/L (ref 100–108)
CO2 SERPL-SCNC: 28 MMOL/L (ref 21–32)
CREAT SERPL-MCNC: 0.8 MG/DL (ref 0.6–1.3)
CRP SERPL QL: <3 MG/L
EOSINOPHIL # BLD AUTO: 0.08 THOUSAND/ΜL (ref 0–0.61)
EOSINOPHIL NFR BLD AUTO: 2 % (ref 0–6)
ERYTHROCYTE [DISTWIDTH] IN BLOOD BY AUTOMATED COUNT: 16.4 % (ref 11.6–15.1)
ERYTHROCYTE [SEDIMENTATION RATE] IN BLOOD: 4 MM/HOUR (ref 0–20)
GFR SERPL CREATININE-BSD FRML MDRD: 80 ML/MIN/1.73SQ M
GLUCOSE P FAST SERPL-MCNC: 111 MG/DL (ref 65–99)
HCT VFR BLD AUTO: 35.8 % (ref 34.8–46.1)
HGB BLD-MCNC: 11.6 G/DL (ref 11.5–15.4)
LYMPHOCYTES # BLD AUTO: 1.45 THOUSANDS/ΜL (ref 0.6–4.47)
LYMPHOCYTES NFR BLD AUTO: 27 % (ref 14–44)
MCH RBC QN AUTO: 20.2 PG (ref 26.8–34.3)
MCHC RBC AUTO-ENTMCNC: 32.4 G/DL (ref 31.4–37.4)
MCV RBC AUTO: 62 FL (ref 82–98)
MONOCYTES # BLD AUTO: 0.45 THOUSAND/ΜL (ref 0.17–1.22)
MONOCYTES NFR BLD AUTO: 9 % (ref 4–12)
NEUTROPHILS # BLD AUTO: 3.3 THOUSANDS/ΜL (ref 1.85–7.62)
NEUTS SEG NFR BLD AUTO: 62 % (ref 43–75)
PLATELET # BLD AUTO: 166 THOUSANDS/UL (ref 149–390)
POTASSIUM SERPL-SCNC: 3.8 MMOL/L (ref 3.5–5.3)
PROT SERPL-MCNC: 6.8 G/DL (ref 6.4–8.2)
RBC # BLD AUTO: 5.74 MILLION/UL (ref 3.81–5.12)
SODIUM SERPL-SCNC: 140 MMOL/L (ref 136–145)
WBC # BLD AUTO: 5.29 THOUSAND/UL (ref 4.31–10.16)

## 2018-03-15 PROCEDURE — 86140 C-REACTIVE PROTEIN: CPT

## 2018-03-15 PROCEDURE — 85652 RBC SED RATE AUTOMATED: CPT

## 2018-03-15 PROCEDURE — 36415 COLL VENOUS BLD VENIPUNCTURE: CPT

## 2018-03-15 PROCEDURE — 85025 COMPLETE CBC W/AUTO DIFF WBC: CPT

## 2018-03-15 PROCEDURE — 80053 COMPREHEN METABOLIC PANEL: CPT

## 2018-03-19 PROBLEM — Z79.899 LONG TERM CURRENT USE OF IMMUNOSUPPRESSIVE DRUG: Status: ACTIVE | Noted: 2018-03-19

## 2018-03-19 PROBLEM — M05.20 RHEUMATOID ARTERITIS (HCC): Status: ACTIVE | Noted: 2017-03-28

## 2018-03-19 PROBLEM — R76.8 HEPATITIS B CORE ANTIBODY POSITIVE: Status: ACTIVE | Noted: 2017-03-28

## 2018-03-19 PROBLEM — Z79.60 LONG TERM CURRENT USE OF IMMUNOSUPPRESSIVE DRUG: Status: ACTIVE | Noted: 2018-03-19

## 2018-03-19 PROBLEM — D69.3 CHRONIC ITP (IDIOPATHIC THROMBOCYTOPENIA) (HCC): Status: ACTIVE | Noted: 2017-07-19

## 2018-03-22 ENCOUNTER — OFFICE VISIT (OUTPATIENT)
Dept: RHEUMATOLOGY | Facility: CLINIC | Age: 63
End: 2018-03-22
Payer: MEDICARE

## 2018-03-22 VITALS
DIASTOLIC BLOOD PRESSURE: 84 MMHG | HEART RATE: 60 BPM | HEIGHT: 61 IN | BODY MASS INDEX: 29.07 KG/M2 | SYSTOLIC BLOOD PRESSURE: 136 MMHG | WEIGHT: 154 LBS

## 2018-03-22 DIAGNOSIS — M06.09 RHEUMATOID ARTHRITIS OF MULTIPLE SITES WITH NEGATIVE RHEUMATOID FACTOR (HCC): Primary | ICD-10-CM

## 2018-03-22 DIAGNOSIS — G56.03 BILATERAL CARPAL TUNNEL SYNDROME: ICD-10-CM

## 2018-03-22 DIAGNOSIS — D69.3 CHRONIC ITP (IDIOPATHIC THROMBOCYTOPENIA) (HCC): ICD-10-CM

## 2018-03-22 DIAGNOSIS — F32.A DEPRESSION, UNSPECIFIED DEPRESSION TYPE: ICD-10-CM

## 2018-03-22 DIAGNOSIS — M79.18 MYOFASCIAL PAIN: ICD-10-CM

## 2018-03-22 DIAGNOSIS — M47.812 SPONDYLOSIS OF CERVICAL REGION WITHOUT MYELOPATHY OR RADICULOPATHY: ICD-10-CM

## 2018-03-22 DIAGNOSIS — M25.561 ACUTE PAIN OF RIGHT KNEE: ICD-10-CM

## 2018-03-22 PROCEDURE — 99214 OFFICE O/P EST MOD 30 MIN: CPT | Performed by: PHYSICIAN ASSISTANT

## 2018-03-22 RX ORDER — TOFACITINIB 11 MG/1
11 TABLET, FILM COATED, EXTENDED RELEASE ORAL DAILY
COMMUNITY
Start: 2018-01-25 | End: 2018-03-22

## 2018-03-22 RX ORDER — TOFACITINIB 11 MG/1
TABLET, FILM COATED, EXTENDED RELEASE ORAL
COMMUNITY

## 2018-03-22 RX ORDER — LORAZEPAM 0.5 MG/1
TABLET ORAL
COMMUNITY
Start: 2017-10-19

## 2018-03-22 RX ORDER — MELATONIN
1000 DAILY
COMMUNITY
End: 2021-02-16

## 2018-03-22 NOTE — PROGRESS NOTES
Assessment/Plan:    1  Rheumatoid arthritis of multiple sites with negative rheumatoid factor ([address] )     2  Bilateral carpal tunnel syndrome     3  Spondylosis of cervical region without myelopathy or radiculopathy     4  Chronic ITP (idiopathic thrombocytopenia) ([facility])     5  Acute pain of right knee  Ambulatory referral to Physical Therapy   6  Depression, unspecified depression type     7  Myofascial pain         Chronic ITP (idiopathic thrombocytopenia) ([facility])      Discussion/Summary:      Daryl Terrell is a 65 y o   female with rheumatoid arthritis and negative RF  The patient states that she has restarted Alina Bark since her last visit as her previous HSV infection had resolved  She states that she continues to have pain in the bilateral hands, feet, ankles as well as her neck and her right knee  She notes swelling in the hands and feet  She denies any further obvious joint swelling  Her sleep is non-restorative daily with fatigue lasting throughout the day  She denies morning stiffness  She reports mild, infrequent hand numbness and was treated with splints over 10 years ago for CTS  Has some neck pain but history of spondylosis with negative MRI of her cervical spine in the past  On physical examination, there is swelling of the bilateral PIPs and MCPs  She has tenderness of the MCPs and PIP is bilaterally as well as the right shoulder  There is tenderness of the ankles and feet  There is crepitus of the knees  Patient's most recent labs reveal an elevated red blood cell count and slightly abnormal RBC indices however she has been seen in the past by a hematologist and diagnosed with ITP  Otherwise her CBC, CMP, CRP, ESR are within normal range  At this time patient's history and physical examination is most consistent with rheumatoid arthritis which appears to be mildly active but stable on Alina Bark   Patient was given a referral to Physical Therapy for VMO strengthening of her right knee for her patellofemoral symptoms and her perceived right knee instability which has occurred intermittently since her last visit despite any injury or trauma to the knee, her knee exam today as well shows no instability, laxity, effusion or tenderness  Recommend second opinion for chronic neck pain  She will speak to her PCM about it in the next 2 weeks  She was told that in regards to her discomfort swallowing at times, she should follow up with her PCP or GI if this continues or worsens however she reports no difficulty with swallowing solids or liquids  As for her bilateral CTS, she was instructed to keep using nighttime splints as needed as she is not interested in any further treatment at this time and her symptoms are very mild  Pt will remain on xeljanz for her RA for now and will call here and stop this medication if she has recurrence or new infection or symptoms of infections such as fevers or chills  She will plan to return to the office in 3 months time for further evaluation we will obtain a CBC, CMP, CRP, ESR before that follow-up  She will contact the office in the interim if she has any further questions or concerns  Subjective:     HPI:     Lisseth Guerrero is a 65 y o   female who present to the office today for follow-up  Patient complains of right knee pain, b/l hand and foot pain  Has had some recent GI discomfort, heartburn and discomfort swallowing but no difficulty swallowing liquids or solids  Has had numbness and tingling in b/l hands dropping objects but no nocturnal waking  Has neck pain and stiffness with reported radicular symptoms, has never had EMG, has seen pain management and PCP for this and has had injections in the neck  Reports negative MRI in the neck  Reports history of carpal tunnel syndrome as well, has used braces  The last note states that the patient stopped the [place], but she restarted it and feels like it is working  Swelling: hands and feet    Symptoms include  and are of moderate and severe when neck is hurting severity  Symptoms are made worse by: lifting weights, activity  Symptoms are helped by Lisseth Beatty, exercise at gym used to help but can't anymore due to neck pain  Overall disease activity:  unchanged  Limitation on activities include inability to go to gym  Morning stiffness lasts : denies morning stiffness  Non-restorative sleep:  daily  Fatigue:  Daily, throughout the day  Current medications include Xeljanz 11mg daily  Patient denies any other changes to her medication, past medical history and surgical history  RAPID 3 Score: 17 2/30    The following portions of the patient's history were reviewed and updated as appropriate:   She  has a past medical history of Anxiety; Arthritis; Depression; Diabetes mellitus ([place] [address] ); Glaucoma; Panic disorder; and RA (rheumatoid arthritis) ([place] [address] )  She   Patient Active Problem List    Diagnosis Date Noted    Bilateral carpal tunnel syndrome 03/22/2018    Acute pain of right knee 03/22/2018    Long term current use of immunosuppressive drug 03/19/2018    Chronic ITP (idiopathic thrombocytopenia) (HCC) 07/19/2017    Hepatitis B core antibody positive 03/28/2017    Rheumatoid arteritis 03/28/2017    Thrombocytopenia ([place] [address] ) 12/16/2016    Hep C w/o coma, chronic ([place] [address] ) 10/10/2016    Tubulovillous adenoma polyp of colon 09/13/2016    Spondylosis of cervical region without myelopathy or radiculopathy 07/28/2016    Skin cancer, basal cell 02/11/2016    Rheumatoid arthritis of multiple sites with negative rheumatoid factor ([place] [address] ) 10/26/2015    Arthropathy of cervical spine ([place] [address] ) 06/25/2015    Anxiety 05/22/2015    Depression 05/22/2015    Diabetes mellitus ([facility] ) 05/22/2015    Hyperlipidemia 05/22/2015    Hypertension 05/22/2015    Lung nodules 05/22/2015    Mediterranean anemia 05/22/2015    Myofascial pain 05/22/2015     She  has a past surgical history that includes Cholecystectomy;  Tubal ligation; Band hemorrhoidectomy; pr colonoscopy flx dx w/collj spec when pfrmd (N/A, 3/6/2017); pr colonoscopy flx dx w/collj spec when pfrmd (N/A, 9/7/2016); and pr colonoscopy flx dx w/collj spec when pfrmd (N/A, 6/28/2016)  Her family history is not on file  She  reports that she quit smoking about 23 years ago  She has never used smokeless tobacco  She reports that she does not drink alcohol or use drugs  Current Outpatient Prescriptions   Medication Sig Dispense Refill    atorvastatin (LIPITOR) 20 mg tablet Take 20 mg by mouth daily   bimatoprost (LUMIGAN) 0 01 % ophthalmic drops 1 drop daily at bedtime   cholecalciferol (VITAMIN D3) 1,000 units tablet Take 1,000 Units by mouth daily      LORazepam (ATIVAN) 0 5 mg tablet 1 PO OD PRN      XELJANZ XR 11 MG TB24 Take by mouth      zolpidem (AMBIEN) 5 mg tablet Take 5 mg by mouth daily at bedtime as needed for sleep  No current facility-administered medications for this visit  Current Outpatient Prescriptions on File Prior to Visit   Medication Sig    atorvastatin (LIPITOR) 20 mg tablet Take 20 mg by mouth daily   bimatoprost (LUMIGAN) 0 01 % ophthalmic drops 1 drop daily at bedtime   zolpidem (AMBIEN) 5 mg tablet Take 5 mg by mouth daily at bedtime as needed for sleep   [DISCONTINUED] fexofenadine (ALLEGRA) 180 MG tablet Take 180 mg by mouth daily   [DISCONTINUED] adalimumab (HUMIRA) 40 mg/0 8 mL PSKT Inject 40 mg under the skin once Indications: Rheumatoid Arthritis   [DISCONTINUED] escitalopram (LEXAPRO) 20 mg tablet Take 20 mg by mouth daily   [DISCONTINUED] folic acid (FOLVITE) 1 mg tablet Take 1 mg by mouth daily   [DISCONTINUED] hydrochlorothiazide (HYDRODIURIL) 50 mg tablet Take 125 mg by mouth daily   [DISCONTINUED] Potassium Chloride (KLOR-CON 10 PO) Take 1 tablet by mouth daily     No current facility-administered medications on file prior to visit  She has No Known Allergies       Review of Systems Constitutional: Positive for diaphoresis, fatigue and unexpected weight change (25 lb weight gain since starting lexapro)  Negative for appetite change, chills and fever  HENT: Negative for congestion, mouth sores and sore throat  Dry mouth     Eyes: Negative for pain, redness and visual disturbance  Respiratory: Negative for cough, chest tightness and shortness of breath  Cardiovascular: Negative for chest pain and leg swelling  Gastrointestinal: Negative for abdominal distention, blood in stool, constipation, diarrhea, nausea and vomiting  Endocrine: Negative for polydipsia and polyuria  Genitourinary: Negative for difficulty urinating, dysuria, frequency, hematuria and urgency  Enuresis: occasionally in hands  Nocturia     Musculoskeletal: Positive for arthralgias, back pain, joint swelling, myalgias, neck pain and neck stiffness  Skin: Negative for rash  Neurological: Positive for numbness and headaches  Negative for dizziness, weakness and light-headedness  Hematological: Negative for adenopathy  Psychiatric/Behavioral: Negative for behavioral problems, dysphoric mood, self-injury and suicidal ideas  The patient is not nervous/anxious  Objective:    Vitals:    03/22/18 1341   BP: 136/84   Pulse: 60       Physical Exam   Constitutional: She is oriented to person, place, and time  She appears well-developed and well-nourished  No distress  HENT:   Head: Normocephalic and atraumatic  Eyes: Conjunctivae are normal    Neck: Neck supple  Cardiovascular: Normal rate and regular rhythm  No murmur heard  Pulmonary/Chest: Effort normal and breath sounds normal  No respiratory distress  She has no wheezes  She has no rales  Musculoskeletal: She exhibits tenderness  + CMC grind on the left only  BUE:  Mildly + tinnels over median nerve on left only, mildly positive carpal tunnel compression (left greater than right), no thenar atrophy, APB strength 4/5    ROM slightly restricted with the neck  Negative spurlings  Right knee:  No laxity  No pain with valgus/varus  Normal anterior drawer  No crepitus  Negative Lachman  No joint line tenderness  No apprehension  Lymphadenopathy:     She has no cervical adenopathy  Neurological: She is alert and oriented to person, place, and time  Skin: Skin is warm and dry  She is not diaphoretic  Psychiatric: She has a normal mood and affect         Physical Exam     Tenderness:   Right sternoclavicular and left sternoclavicular  RUE: carpometacarpal  LUE: carpometacarpal  Right hand: 3rd MCP and 3rd PIP  Left hand: 3rd MCP and 3rd PIP  RLE: tibiofemoral and tibiotalar  LLE: tibiotalar    Swelling:   Right hand: 2nd MCP, 3rd MCP, 4th MCP, 5th MCP, 2nd PIP, 3rd PIP, 4th PIP and 5th PIP  Left hand: 2nd MCP, 3rd MCP, 4th MCP, 5th MCP, 2nd PIP, 3rd PIP, 4th PIP and 5th PIP    BRADY-28 tender joint count: 5  BRADY-28 swollen joint count: 16  BRADY-28 score: 2 37 (Remission)

## 2018-04-02 ENCOUNTER — TELEPHONE (OUTPATIENT)
Dept: OBGYN CLINIC | Facility: HOSPITAL | Age: 63
End: 2018-04-02

## 2018-04-02 NOTE — TELEPHONE ENCOUNTER
Pt says she was in the office last Thursday and asked someone to fax her records for her but she gave them the wrong number  The correct fax number is [phone]  She would like someone to call her back at [phone] to let her know where her records were faxed the first time

## 2018-04-03 NOTE — TELEPHONE ENCOUNTER
Left message on machine for patient and also provided her with the number for [employer] who processes all of our record requests

## 2018-04-16 ENCOUNTER — TRANSCRIBE ORDERS (OUTPATIENT)
Dept: ADMINISTRATIVE | Facility: HOSPITAL | Age: 63
End: 2018-04-16

## 2018-04-16 DIAGNOSIS — Z12.39 BREAST SCREENING: Primary | ICD-10-CM

## 2018-06-11 ENCOUNTER — HOSPITAL ENCOUNTER (OUTPATIENT)
Dept: MAMMOGRAPHY | Facility: HOSPITAL | Age: 63
Discharge: HOME | End: 2018-06-11
Payer: MEDICARE

## 2018-06-11 DIAGNOSIS — Z12.39 BREAST SCREENING: ICD-10-CM

## 2018-06-11 PROCEDURE — 77067 SCR MAMMO BI INCL CAD: CPT

## 2019-05-13 ENCOUNTER — TRANSCRIBE ORDERS (OUTPATIENT)
Dept: ADMINISTRATIVE | Facility: HOSPITAL | Age: 64
End: 2019-05-13

## 2019-05-13 DIAGNOSIS — Z12.31 ENCOUNTER FOR SCREENING MAMMOGRAM FOR MALIGNANT NEOPLASM OF BREAST: Primary | ICD-10-CM

## 2019-10-29 ENCOUNTER — HOSPITAL ENCOUNTER (EMERGENCY)
Facility: HOSPITAL | Age: 64
Discharge: HOME | End: 2019-10-29
Attending: EMERGENCY MEDICINE | Admitting: EMERGENCY MEDICINE
Payer: MEDICARE

## 2019-10-29 ENCOUNTER — APPOINTMENT (EMERGENCY)
Dept: RADIOLOGY | Facility: HOSPITAL | Age: 64
End: 2019-10-29
Payer: MEDICARE

## 2019-10-29 VITALS
HEART RATE: 82 BPM | BODY MASS INDEX: 34.84 KG/M2 | TEMPERATURE: 98 F | RESPIRATION RATE: 18 BRPM | SYSTOLIC BLOOD PRESSURE: 172 MMHG | OXYGEN SATURATION: 95 % | HEIGHT: 61 IN | DIASTOLIC BLOOD PRESSURE: 83 MMHG | WEIGHT: 184.53 LBS

## 2019-10-29 DIAGNOSIS — M13.0 POLYARTHRITIS: Primary | ICD-10-CM

## 2019-10-29 LAB
ALBUMIN SERPL BCP-MCNC: 3 G/DL (ref 3.5–5)
ALP SERPL-CCNC: 85 U/L (ref 46–116)
ALT SERPL W P-5'-P-CCNC: 18 U/L (ref 12–78)
ANION GAP SERPL CALCULATED.3IONS-SCNC: 8 MMOL/L (ref 4–13)
AST SERPL W P-5'-P-CCNC: 17 U/L (ref 5–45)
BASOPHILS # BLD AUTO: 0.01 THOUSANDS/ΜL (ref 0–0.1)
BASOPHILS NFR BLD AUTO: 0 % (ref 0–1)
BILIRUB SERPL-MCNC: 0.5 MG/DL (ref 0.2–1)
BILIRUB UR QL STRIP: NEGATIVE
BUN SERPL-MCNC: 13 MG/DL (ref 5–25)
CALCIUM SERPL-MCNC: 8.2 MG/DL (ref 8.3–10.1)
CHLORIDE SERPL-SCNC: 102 MMOL/L (ref 100–108)
CLARITY UR: CLEAR
CO2 SERPL-SCNC: 28 MMOL/L (ref 21–32)
COLOR UR: YELLOW
CREAT SERPL-MCNC: 0.7 MG/DL (ref 0.6–1.3)
CRP SERPL QL: 27.3 MG/L
EOSINOPHIL # BLD AUTO: 0.06 THOUSAND/ΜL (ref 0–0.61)
EOSINOPHIL NFR BLD AUTO: 1 % (ref 0–6)
ERYTHROCYTE [DISTWIDTH] IN BLOOD BY AUTOMATED COUNT: 16.5 % (ref 11.6–15.1)
GFR SERPL CREATININE-BSD FRML MDRD: 93 ML/MIN/1.73SQ M
GLUCOSE SERPL-MCNC: 224 MG/DL (ref 65–140)
GLUCOSE UR STRIP-MCNC: ABNORMAL MG/DL
HCT VFR BLD AUTO: 37.8 % (ref 34.8–46.1)
HGB BLD-MCNC: 11.9 G/DL (ref 11.5–15.4)
HGB UR QL STRIP.AUTO: NEGATIVE
IMM GRANULOCYTES # BLD AUTO: 0.03 THOUSAND/UL (ref 0–0.2)
IMM GRANULOCYTES NFR BLD AUTO: 0 % (ref 0–2)
KETONES UR STRIP-MCNC: NEGATIVE MG/DL
LEUKOCYTE ESTERASE UR QL STRIP: NEGATIVE
LYMPHOCYTES # BLD AUTO: 1.17 THOUSANDS/ΜL (ref 0.6–4.47)
LYMPHOCYTES NFR BLD AUTO: 12 % (ref 14–44)
MCH RBC QN AUTO: 21 PG (ref 26.8–34.3)
MCHC RBC AUTO-ENTMCNC: 31.5 G/DL (ref 31.4–37.4)
MCV RBC AUTO: 67 FL (ref 82–98)
MONOCYTES # BLD AUTO: 0.6 THOUSAND/ΜL (ref 0.17–1.22)
MONOCYTES NFR BLD AUTO: 6 % (ref 4–12)
NEUTROPHILS # BLD AUTO: 7.54 THOUSANDS/ΜL (ref 1.85–7.62)
NEUTS SEG NFR BLD AUTO: 81 % (ref 43–75)
NITRITE UR QL STRIP: NEGATIVE
NRBC BLD AUTO-RTO: 0 /100 WBCS
PH UR STRIP.AUTO: 5.5 [PH]
PLATELET # BLD AUTO: 201 THOUSANDS/UL (ref 149–390)
POTASSIUM SERPL-SCNC: 3.3 MMOL/L (ref 3.5–5.3)
PROT SERPL-MCNC: 6.6 G/DL (ref 6.4–8.2)
PROT UR STRIP-MCNC: NEGATIVE MG/DL
RBC # BLD AUTO: 5.67 MILLION/UL (ref 3.81–5.12)
SODIUM SERPL-SCNC: 138 MMOL/L (ref 136–145)
SP GR UR STRIP.AUTO: 1.01 (ref 1–1.03)
UROBILINOGEN UR QL STRIP.AUTO: 0.2 E.U./DL
WBC # BLD AUTO: 9.41 THOUSAND/UL (ref 4.31–10.16)

## 2019-10-29 PROCEDURE — 73110 X-RAY EXAM OF WRIST: CPT

## 2019-10-29 PROCEDURE — 86140 C-REACTIVE PROTEIN: CPT | Performed by: EMERGENCY MEDICINE

## 2019-10-29 PROCEDURE — 80053 COMPREHEN METABOLIC PANEL: CPT | Performed by: EMERGENCY MEDICINE

## 2019-10-29 PROCEDURE — 86618 LYME DISEASE ANTIBODY: CPT | Performed by: EMERGENCY MEDICINE

## 2019-10-29 PROCEDURE — 96374 THER/PROPH/DIAG INJ IV PUSH: CPT

## 2019-10-29 PROCEDURE — 36415 COLL VENOUS BLD VENIPUNCTURE: CPT | Performed by: EMERGENCY MEDICINE

## 2019-10-29 PROCEDURE — 81003 URINALYSIS AUTO W/O SCOPE: CPT | Performed by: EMERGENCY MEDICINE

## 2019-10-29 PROCEDURE — 99284 EMERGENCY DEPT VISIT MOD MDM: CPT

## 2019-10-29 PROCEDURE — 96361 HYDRATE IV INFUSION ADD-ON: CPT

## 2019-10-29 PROCEDURE — 85025 COMPLETE CBC W/AUTO DIFF WBC: CPT | Performed by: EMERGENCY MEDICINE

## 2019-10-29 PROCEDURE — 99284 EMERGENCY DEPT VISIT MOD MDM: CPT | Performed by: EMERGENCY MEDICINE

## 2019-10-29 RX ORDER — KETOROLAC TROMETHAMINE 30 MG/ML
15 INJECTION, SOLUTION INTRAMUSCULAR; INTRAVENOUS ONCE
Status: DISCONTINUED | OUTPATIENT
Start: 2019-10-29 | End: 2019-10-29

## 2019-10-29 RX ORDER — PREDNISONE 20 MG/1
20 TABLET ORAL ONCE
Status: COMPLETED | OUTPATIENT
Start: 2019-10-29 | End: 2019-10-29

## 2019-10-29 RX ORDER — PREDNISONE 20 MG/1
20 TABLET ORAL DAILY
Qty: 5 TABLET | Refills: 0 | Status: SHIPPED | OUTPATIENT
Start: 2019-10-29 | End: 2019-11-03

## 2019-10-29 RX ORDER — KETOROLAC TROMETHAMINE 30 MG/ML
15 INJECTION, SOLUTION INTRAMUSCULAR; INTRAVENOUS ONCE
Status: COMPLETED | OUTPATIENT
Start: 2019-10-29 | End: 2019-10-29

## 2019-10-29 RX ADMIN — PREDNISONE 20 MG: 20 TABLET ORAL at 12:13

## 2019-10-29 RX ADMIN — SODIUM CHLORIDE 1000 ML: 0.9 INJECTION, SOLUTION INTRAVENOUS at 09:59

## 2019-10-29 RX ADMIN — KETOROLAC TROMETHAMINE 15 MG: 30 INJECTION, SOLUTION INTRAMUSCULAR at 09:57

## 2019-10-29 NOTE — ED PROVIDER NOTES
History  Chief Complaint   Patient presents with    Joint Pain     Pt  reports joints are painful and numb  Pt  reports no upper body strength, started 3 weeks ago  In past 3 weeks pt was on prednisone and celebrex  Symptoms persist  Pt  reports falling this am  Pt  reports left wrist pain from the fall  Patient is a 79-year-old female with a history of osteoarthritis who presents with joint pains and weakness  Patient states that she has had generalized joint pain for 3 weeks  She describes pain in her upper and lower extremities  She has noticed swelling, primarily in her right hand and bilateral knees  She also describes weakness in these joints  Today, she tried to stand up from the couch and her legs gave out  This resulted in a fall onto her left side  She has been able to ambulate since that time  She denies head injury  She has seen her PCP and rheumatologist for the symptoms  She completed a course of prednisone and was recently started on Celebrex  She denies any improvement in states that symptoms have progressively worsened  She denies fever, chills, nausea, vomiting, rash or other complaints  History provided by:  Patient  Leg Pain   Location:  Knee  Knee location:  L knee and R knee  Pain details:     Quality:  Aching    Radiates to:  Does not radiate    Severity:  Moderate    Duration:  3 weeks    Timing:  Constant    Progression:  Worsening  Chronicity:  New  Ineffective treatments:  NSAIDs (predisone)  Associated symptoms: fatigue, stiffness and swelling    Associated symptoms: no back pain, no decreased ROM, no fever, no neck pain, no numbness and no tingling        Prior to Admission Medications   Prescriptions Last Dose Informant Patient Reported? Taking? LORazepam (ATIVAN) 0 5 mg tablet   Yes No   Si PO OD PRN   XELJANZ XR 11 MG TB24   Yes No   Sig: Take by mouth   atorvastatin (LIPITOR) 20 mg tablet   Yes No   Sig: Take 20 mg by mouth daily     bimatoprost (LUMIGAN) 0 01 % ophthalmic drops   Yes No   Si drop daily at bedtime  cholecalciferol (VITAMIN D3) 1,000 units tablet  Self Yes No   Sig: Take 1,000 Units by mouth daily   zolpidem (AMBIEN) 5 mg tablet   Yes No   Sig: Take 5 mg by mouth daily at bedtime as needed for sleep  Facility-Administered Medications: None       Past Medical History:   Diagnosis Date    Anxiety     Arthritis     Depression     Diabetes mellitus (HCC)     diet controlled    Glaucoma     Panic disorder     RA (rheumatoid arthritis) ([facility] [address] )        Past Surgical History:   Procedure Laterality Date    BAND HEMORRHOIDECTOMY      CHOLECYSTECTOMY      WA COLONOSCOPY FLX DX W/COLLJ SPEC WHEN PFRMD N/A 3/6/2017    Procedure: COLONOSCOPY;  Surgeon: Sparkle Harmon MD;  [facility]; Service: Gastroenterology    WA COLONOSCOPY FLX DX W/COLLJ SPEC WHEN PFRMD N/A 2016    Procedure: COLONOSCOPY;  Surgeon: Sparkle Harmon MD;  [facility]; Service: Gastroenterology    WA COLONOSCOPY FLX DX W/COLLJ SPEC WHEN PFRMD N/A 2016    Procedure: COLONOSCOPY;  Surgeon: Sparkle Harmon MD;  [facility]; Service: Gastroenterology    TUBAL LIGATION         History reviewed  No pertinent family history  I have reviewed and agree with the history as documented  Social History     Tobacco Use    Smoking status: Former Smoker     Last attempt to quit:      Years since quittin 8    Smokeless tobacco: Never Used   Substance Use Topics    Alcohol use: No    Drug use: No        Review of Systems   Constitutional: Positive for fatigue  Negative for chills, diaphoresis and fever  HENT: Negative for nosebleeds, sore throat and trouble swallowing  Eyes: Negative for photophobia, pain and visual disturbance  Respiratory: Negative for cough, chest tightness and shortness of breath  Cardiovascular: Negative for chest pain, palpitations and leg swelling     Gastrointestinal: Negative for abdominal pain, constipation, diarrhea, nausea and vomiting  Endocrine: Negative for polydipsia and polyuria  Genitourinary: Negative for difficulty urinating, dysuria, hematuria, pelvic pain, vaginal bleeding and vaginal discharge  Musculoskeletal: Positive for stiffness  Negative for back pain, neck pain and neck stiffness  Skin: Negative for pallor and rash  Neurological: Negative for dizziness, seizures, light-headedness and headaches  All other systems reviewed and are negative  Physical Exam  Physical Exam   Constitutional: She is oriented to person, place, and time  She appears well-developed and well-nourished  No distress  HENT:   Head: Normocephalic and atraumatic  Mouth/Throat: Oropharynx is clear and moist and mucous membranes are normal    Eyes: Pupils are equal, round, and reactive to light  EOM are normal    Neck: Normal range of motion  Neck supple  Cardiovascular: Normal rate, regular rhythm, normal heart sounds, intact distal pulses and normal pulses  Pulmonary/Chest: Effort normal and breath sounds normal  No respiratory distress  Abdominal: Soft  She exhibits no distension  There is no tenderness  There is no rigidity, no rebound and no guarding  Musculoskeletal: Normal range of motion  She exhibits no edema or tenderness  Right hand: She exhibits swelling (Primarily MCP joints)  She exhibits normal range of motion  Lymphadenopathy:     She has no cervical adenopathy  Neurological: She is alert and oriented to person, place, and time  She has normal strength  No cranial nerve deficit or sensory deficit  Skin: Skin is warm and dry  Capillary refill takes less than 2 seconds  Psychiatric: She has a normal mood and affect  Nursing note and vitals reviewed        Vital Signs  ED Triage Vitals [10/29/19 0913]   Temperature Pulse Respirations Blood Pressure SpO2   98 °F (36 7 °C) 103 18 (!) 179/88 97 %      Temp Source Heart Rate Source Patient Position - Orthostatic VS BP Location FiO2 (%)   Oral Monitor Lying Right arm --      Pain Score       Worst Possible Pain           Vitals:    10/29/19 0913 10/29/19 1130   BP: (!) 179/88 (!) 172/83   Pulse: 103 82   Patient Position - Orthostatic VS: Lying Lying         Visual Acuity      ED Medications  Medications   sodium chloride 0 9 % bolus 1,000 mL (0 mL Intravenous Stopped 10/29/19 1213)   ketorolac (TORADOL) injection 15 mg (15 mg Intravenous Given 10/29/19 0957)   predniSONE tablet 20 mg (20 mg Oral Given 10/29/19 1213)       Diagnostic Studies  Results Reviewed     Procedure Component Value Units Date/Time    UA w Reflex to Microscopic [[id number]]  (Abnormal) Collected:  10/29/19 1117    Lab Status:  Final result Specimen:  Urine, Clean Catch Updated:  10/29/19 1127     Color, UA Yellow     Clarity, UA Clear     Specific Gravity, UA 1 010     pH, UA 5 5     Leukocytes, UA Negative     Nitrite, UA Negative     Protein, UA Negative mg/dl      Glucose,  (1/10%) mg/dl      Ketones, UA Negative mg/dl      Urobilinogen, UA 0 2 E U /dl      Bilirubin, UA Negative     Blood, UA Negative    C-reactive protein [[id number]]  (Abnormal) Collected:  10/29/19 1000    Lab Status:  Final result Specimen:  Blood from Arm, Right Updated:  10/29/19 1059     CRP 27 3 mg/L     Comprehensive metabolic panel [[id number]]  (Abnormal) Collected:  10/29/19 1000    Lab Status:  Final result Specimen:  Blood from Arm, Right Updated:  10/29/19 1023     Sodium 138 mmol/L      Potassium 3 3 mmol/L      Chloride 102 mmol/L      CO2 28 mmol/L      ANION GAP 8 mmol/L      BUN 13 mg/dL      Creatinine 0 70 mg/dL      Glucose 224 mg/dL      Calcium 8 2 mg/dL      AST 17 U/L      ALT 18 U/L      Alkaline Phosphatase 85 U/L      Total Protein 6 6 g/dL      Albumin 3 0 g/dL      Total Bilirubin 0 50 mg/dL      eGFR 93 ml/min/1 73sq m     Narrative:       [place] guidelines for Chronic Kidney Disease (CKD):     Stage 1 with normal or high GFR (GFR > 90 mL/min/1 73 square meters)    Stage 2 Mild CKD (GFR = 60-89 mL/min/1 73 square meters)    Stage 3A Moderate CKD (GFR = 45-59 mL/min/1 73 square meters)    Stage 3B Moderate CKD (GFR = 30-44 mL/min/1 73 square meters)    Stage 4 Severe CKD (GFR = 15-29 mL/min/1 73 square meters)    Stage 5 End Stage CKD (GFR <15 mL/min/1 73 square meters)  Note: GFR calculation is accurate only with a steady state creatinine    CBC and differential [[id number]]  (Abnormal) Collected:  10/29/19 0959    Lab Status:  Final result Specimen:  Blood from Arm, Right Updated:  10/29/19 1007     WBC 9 41 Thousand/uL      RBC 5 67 Million/uL      Hemoglobin 11 9 g/dL      Hematocrit 37 8 %      MCV 67 fL      MCH 21 0 pg      MCHC 31 5 g/dL      RDW 16 5 %      Platelets 259 Thousands/uL      nRBC 0 /100 WBCs      Neutrophils Relative 81 %      Immat GRANS % 0 %      Lymphocytes Relative 12 %      Monocytes Relative 6 %      Eosinophils Relative 1 %      Basophils Relative 0 %      Neutrophils Absolute 7 54 Thousands/µL      Immature Grans Absolute 0 03 Thousand/uL      Lymphocytes Absolute 1 17 Thousands/µL      Monocytes Absolute 0 60 Thousand/µL      Eosinophils Absolute 0 06 Thousand/µL      Basophils Absolute 0 01 Thousands/µL     Lyme Antibody Profile with reflex to WB [[id number]] Collected:  10/29/19 1000    Lab Status: In process Specimen:  Blood from Arm, Right Updated:  10/29/19 1002                 XR wrist 3+ views LEFT   ED Interpretation by Iona Onofre DO (10/29 1049)   No fracture or dislocation  Final Result by Tyler Ryan MD (10/29 1100)      No acute osseous abnormality  Workstation performed: [id number]                    Procedures  Procedures       ED Course                               MDM  Number of Diagnoses or Management Options  Polyarthritis: new and requires workup  Diagnosis management comments: Patient presents with arthralgias and weakness    She describes arthralgias in multiple joints and weakness primarily in proximal muscles  No objective weakness on exam  NIHSS = 0  Do not suspect CVA as cause of weakness  CRP elevated compared to previous value of 7 2 on 10/14/2019  Possible inflammatory arthritis, polymyalgia rheumatica  Low suspicion for septic arthritis  Patient is afebrile, no leukocytosis and no significant joint effusions limitations in range of motion  Patient was previously on prednisone but it did not seem to help  However her inflammatory markers were unremarkable at this time  Now that her CRP is elevated, will trial prednisone and have patient follow up with her rheumatologist   She is ambulating without difficulty  She is comfortable with discharge in outpatient follow-up  Will return to ED sooner if symptoms worsen or persist        Amount and/or Complexity of Data Reviewed  Clinical lab tests: ordered and reviewed  Tests in the medicine section of CPT®: reviewed and ordered  Review and summarize past medical records: yes  Independent visualization of images, tracings, or specimens: yes    Risk of Complications, Morbidity, and/or Mortality  Presenting problems: moderate  Diagnostic procedures: low  Management options: moderate    Patient Progress  Patient progress: stable      Disposition  Final diagnoses:   Polyarthritis     Time reflects when diagnosis was documented in both MDM as applicable and the Disposition within this note     Time User Action Codes Description Comment    10/29/2019 12:02 PM Courtney Cea Add [M13 0] Polyarthritis       ED Disposition     ED Disposition Condition Date/Time Comment    Discharge Stable Tue Oct 29, 2019 12:02 PM Toma Mtz discharge to home/self care              Follow-up Information     Follow up With Specialties Details Why Contact Info    Jono Becker MD Internal Medicine Schedule an appointment as soon as possible for a visit   [address] [place] [address]       [facility], MD Internal Medicine, Rheumatology Schedule an appointment as soon as possible for a visit  Return to emergency department if symptoms worsen or persist  [address] [address]  [phone]            Discharge Medication List as of 10/29/2019 12:05 PM      START taking these medications    Details   predniSONE 20 mg tablet Take 1 tablet (20 mg total) by mouth daily for 5 days, Starting Tue 10/29/2019, Until Sun 11/3/2019, Normal         CONTINUE these medications which have NOT CHANGED    Details   atorvastatin (LIPITOR) 20 mg tablet Take 20 mg by mouth daily  , Until Discontinued, Historical Med      bimatoprost (LUMIGAN) 0 01 % ophthalmic drops 1 drop daily at bedtime  , Until Discontinued, Historical Med      cholecalciferol (VITAMIN D3) 1,000 units tablet Take 1,000 Units by mouth daily, Historical Med      LORazepam (ATIVAN) 0 5 mg tablet 1 PO OD PRN, Historical Med      XELJANZ XR 11 MG TB24 Take by mouth, Historical Med      zolpidem (AMBIEN) 5 mg tablet Take 5 mg by mouth daily at bedtime as needed for sleep , Until Discontinued, Historical Med           No discharge procedures on file      ED Provider  Electronically Signed by           Zeus Tesfaye DO  10/29/19 2533

## 2019-10-30 LAB — B BURGDOR IGG+IGM SER-ACNC: <0.91 ISR (ref 0–0.9)

## 2019-11-14 ENCOUNTER — HOSPITAL ENCOUNTER (EMERGENCY)
Facility: HOSPITAL | Age: 64
Discharge: HOME | End: 2019-11-14
Attending: EMERGENCY MEDICINE | Admitting: EMERGENCY MEDICINE
Payer: MEDICARE

## 2019-11-14 VITALS
WEIGHT: 179.01 LBS | HEART RATE: 85 BPM | BODY MASS INDEX: 33.8 KG/M2 | TEMPERATURE: 98.4 F | HEIGHT: 61 IN | DIASTOLIC BLOOD PRESSURE: 79 MMHG | OXYGEN SATURATION: 96 % | RESPIRATION RATE: 18 BRPM | SYSTOLIC BLOOD PRESSURE: 146 MMHG

## 2019-11-14 DIAGNOSIS — M25.50 POLYARTHRALGIA: Primary | ICD-10-CM

## 2019-11-14 DIAGNOSIS — M79.89 BILATERAL HAND SWELLING: ICD-10-CM

## 2019-11-14 LAB
ALBUMIN SERPL BCP-MCNC: 3.2 G/DL (ref 3.5–5)
ALP SERPL-CCNC: 90 U/L (ref 46–116)
ALT SERPL W P-5'-P-CCNC: 31 U/L (ref 12–78)
ANION GAP SERPL CALCULATED.3IONS-SCNC: 10 MMOL/L (ref 4–13)
AST SERPL W P-5'-P-CCNC: 19 U/L (ref 5–45)
BASOPHILS # BLD AUTO: 0.02 THOUSANDS/ΜL (ref 0–0.1)
BASOPHILS NFR BLD AUTO: 0 % (ref 0–1)
BILIRUB SERPL-MCNC: 0.6 MG/DL (ref 0.2–1)
BUN SERPL-MCNC: 15 MG/DL (ref 5–25)
CALCIUM SERPL-MCNC: 8.6 MG/DL (ref 8.3–10.1)
CHLORIDE SERPL-SCNC: 102 MMOL/L (ref 100–108)
CO2 SERPL-SCNC: 28 MMOL/L (ref 21–32)
CREAT SERPL-MCNC: 0.51 MG/DL (ref 0.6–1.3)
CRP SERPL QL: 24.1 MG/L
EOSINOPHIL # BLD AUTO: 0.05 THOUSAND/ΜL (ref 0–0.61)
EOSINOPHIL NFR BLD AUTO: 1 % (ref 0–6)
ERYTHROCYTE [DISTWIDTH] IN BLOOD BY AUTOMATED COUNT: 15.9 % (ref 11.6–15.1)
ERYTHROCYTE [SEDIMENTATION RATE] IN BLOOD: 22 MM/HOUR (ref 0–20)
GFR SERPL CREATININE-BSD FRML MDRD: 103 ML/MIN/1.73SQ M
GLUCOSE SERPL-MCNC: 128 MG/DL (ref 65–140)
HCT VFR BLD AUTO: 37.7 % (ref 34.8–46.1)
HGB BLD-MCNC: 11.8 G/DL (ref 11.5–15.4)
IMM GRANULOCYTES # BLD AUTO: 0.04 THOUSAND/UL (ref 0–0.2)
IMM GRANULOCYTES NFR BLD AUTO: 0 % (ref 0–2)
LYMPHOCYTES # BLD AUTO: 1.82 THOUSANDS/ΜL (ref 0.6–4.47)
LYMPHOCYTES NFR BLD AUTO: 18 % (ref 14–44)
MCH RBC QN AUTO: 20.9 PG (ref 26.8–34.3)
MCHC RBC AUTO-ENTMCNC: 31.3 G/DL (ref 31.4–37.4)
MCV RBC AUTO: 67 FL (ref 82–98)
MONOCYTES # BLD AUTO: 0.67 THOUSAND/ΜL (ref 0.17–1.22)
MONOCYTES NFR BLD AUTO: 7 % (ref 4–12)
NEUTROPHILS # BLD AUTO: 7.57 THOUSANDS/ΜL (ref 1.85–7.62)
NEUTS SEG NFR BLD AUTO: 74 % (ref 43–75)
NRBC BLD AUTO-RTO: 0 /100 WBCS
PLATELET # BLD AUTO: 193 THOUSANDS/UL (ref 149–390)
POTASSIUM SERPL-SCNC: 3.2 MMOL/L (ref 3.5–5.3)
PROT SERPL-MCNC: 6.8 G/DL (ref 6.4–8.2)
RBC # BLD AUTO: 5.64 MILLION/UL (ref 3.81–5.12)
SODIUM SERPL-SCNC: 140 MMOL/L (ref 136–145)
WBC # BLD AUTO: 10.17 THOUSAND/UL (ref 4.31–10.16)

## 2019-11-14 PROCEDURE — 96374 THER/PROPH/DIAG INJ IV PUSH: CPT

## 2019-11-14 PROCEDURE — 85652 RBC SED RATE AUTOMATED: CPT | Performed by: EMERGENCY MEDICINE

## 2019-11-14 PROCEDURE — 85025 COMPLETE CBC W/AUTO DIFF WBC: CPT | Performed by: EMERGENCY MEDICINE

## 2019-11-14 PROCEDURE — 99284 EMERGENCY DEPT VISIT MOD MDM: CPT | Performed by: EMERGENCY MEDICINE

## 2019-11-14 PROCEDURE — 96375 TX/PRO/DX INJ NEW DRUG ADDON: CPT

## 2019-11-14 PROCEDURE — 80053 COMPREHEN METABOLIC PANEL: CPT | Performed by: EMERGENCY MEDICINE

## 2019-11-14 PROCEDURE — 36415 COLL VENOUS BLD VENIPUNCTURE: CPT | Performed by: EMERGENCY MEDICINE

## 2019-11-14 PROCEDURE — 86140 C-REACTIVE PROTEIN: CPT | Performed by: EMERGENCY MEDICINE

## 2019-11-14 PROCEDURE — 99283 EMERGENCY DEPT VISIT LOW MDM: CPT

## 2019-11-14 RX ORDER — ONDANSETRON 2 MG/ML
4 INJECTION INTRAMUSCULAR; INTRAVENOUS ONCE
Status: COMPLETED | OUTPATIENT
Start: 2019-11-14 | End: 2019-11-14

## 2019-11-14 RX ORDER — MORPHINE SULFATE 10 MG/ML
6 INJECTION, SOLUTION INTRAMUSCULAR; INTRAVENOUS ONCE
Status: COMPLETED | OUTPATIENT
Start: 2019-11-14 | End: 2019-11-14

## 2019-11-14 RX ORDER — OXYCODONE HYDROCHLORIDE AND ACETAMINOPHEN 5; 325 MG/1; MG/1
1 TABLET ORAL EVERY 4 HOURS PRN
Qty: 15 TABLET | Refills: 0 | Status: SHIPPED | OUTPATIENT
Start: 2019-11-14 | End: 2020-07-10

## 2019-11-14 RX ADMIN — ONDANSETRON 4 MG: 2 INJECTION INTRAMUSCULAR; INTRAVENOUS at 13:32

## 2019-11-14 RX ADMIN — MORPHINE SULFATE 6 MG: 10 INJECTION INTRAVENOUS at 13:32

## 2019-11-14 NOTE — ED PROVIDER NOTES
History  Chief Complaint   Patient presents with    Arm Swelling     bilateral hand swelling  started in the left hand and now has gone to the right hand  has been taking 1000mg of aspirin  pt reports she cannot sleep, dress herself due to pain, has no strength in her hands  she would like to know what it is  she has an appointment with rhuematology tomorrow    pt has been here for the same     62 y o  Female presents with chief complaint of bilateral hand swelling and pain, left shoulder discomfort and stiffness and bilateral knee pain and stiffness  Patient reports she has had these symptoms off and on for several months  She has been seen in the ED and by rheumatology for the same without significant improvement  She presents today because pain became more significant and she was unable to sleep  Contacted her rheumatology office who understandably referred her here for symptom control  Patient does have an appointment with rheum tomorrow  No fevers, chills, skin erythema, rash  Patient states she has a history of rheumatoid arthritis but that her markers are all normal now and that the treatments aimed at [address] [place] [address] Street rheumatica are not causing any relief  History provided by:  Patient   used: No    Medical Problem   Location:  Bilateral hands  Quality:  Swelling and pain   Severity:  Moderate  Onset quality:  Sudden  Duration:  2 days  Timing:  Constant  Progression:  Worsening  Chronicity:  Recurrent  Associated symptoms: no chest pain, no congestion, no diarrhea, no fever, no nausea, no rash, no shortness of breath and no vomiting        Prior to Admission Medications   Prescriptions Last Dose Informant Patient Reported? Taking? LORazepam (ATIVAN) 0 5 mg tablet   Yes No   Si PO OD PRN   XELJANZ XR 11 MG TB24   Yes No   Sig: Take by mouth   atorvastatin (LIPITOR) 20 mg tablet   Yes No   Sig: Take 20 mg by mouth daily     bimatoprost (LUMIGAN) 0 01 % ophthalmic drops   Yes If you had any lab work or radiology testing ordered at your visit today, please allow 7-10 business days from the date your test was performed for the results to be reviewed and sent by our office. If you utilize [employer], please know that your test results may be posted online within a few hours or a few weeks, depending on the type of test you have done. Keep in mind, your doctor may not always have had the opportunity to review your results before they are released to your [employer] account.       In the next few weeks you may receive a [employer] Ganey survey regarding your most recent clinic visit with us.  Please take a few moments out of your day to accurately evaluate your visit.  We strive to provide you with the best medical care and hope you are happy with our services 100% of the time. Again, thank you for your time and we look forward to your next visit.         Please call orthopedist, Dr. Keo Keene, to schedule an appointment.  [phone]     No   Si drop daily at bedtime  cholecalciferol (VITAMIN D3) 1,000 units tablet  Self Yes No   Sig: Take 1,000 Units by mouth daily   zolpidem (AMBIEN) 5 mg tablet   Yes No   Sig: Take 5 mg by mouth daily at bedtime as needed for sleep  Facility-Administered Medications: None       Past Medical History:   Diagnosis Date    Anxiety     Arthritis     Depression     Diabetes mellitus (HCC)     diet controlled    Glaucoma     Hyperlipidemia     Hypertension     Panic disorder     RA [facility]rheumatoid arthritis) ([facility] [address] )        Past Surgical History:   Procedure Laterality Date    BAND HEMORRHOIDECTOMY      CHOLECYSTECTOMY      [place] COLONOSCOPY FLX DX W/COLLJ SPEC WHEN PFRMD N/A 3/6/2017    Procedure: COLONOSCOPY;  Surgeon: Radha Campbell MD;  [facility]; Service: Gastroenterology    IA COLONOSCOPY FLX DX W/COLLJ SPEC WHEN PFRMD N/A 2016    Procedure: COLONOSCOPY;  Surgeon: Radha Campbell MD;  [facility]; Service: Gastroenterology    IA COLONOSCOPY FLX DX W/COLLJ SPEC WHEN PFRMD N/A 2016    Procedure: COLONOSCOPY;  Surgeon: Radha Campbell MD;  [facility]; Service: Gastroenterology    TUBAL LIGATION       History reviewed  No pertinent family history  I have reviewed and agree with the history as documented  Social History     Tobacco Use    Smoking status: Former Smoker     Last attempt to quit:      Years since quittin 8    Smokeless tobacco: Never Used   Substance Use Topics    Alcohol use: No    Drug use: No        Review of Systems   Constitutional: Negative for chills, diaphoresis and fever  HENT: Negative for congestion  Respiratory: Negative for shortness of breath  Cardiovascular: Negative for chest pain and palpitations  Gastrointestinal: Negative for diarrhea, nausea and vomiting  Genitourinary: Negative for dysuria and frequency     Musculoskeletal: Positive for arthralgias (bilateral shoulders and knees) and joint swelling (bilateral hands/wrists)  Skin: Negative for color change, rash and wound  All other systems reviewed and are negative  Physical Exam  Physical Exam   Constitutional: She is oriented to person, place, and time  She appears well-developed and well-nourished  She appears distressed  HENT:   Head: Normocephalic and atraumatic  Eyes: Pupils are equal, round, and reactive to light  EOM are normal    Neck: Normal range of motion  No JVD present  Cardiovascular: Normal rate, regular rhythm, normal heart sounds and intact distal pulses  Exam reveals no gallop and no friction rub  No murmur heard  Pulmonary/Chest: Effort normal and breath sounds normal  No respiratory distress  She has no wheezes  She has no rales  She exhibits no tenderness  Musculoskeletal: Normal range of motion  She exhibits edema (bilateral hands, left worse than right) and tenderness (bilateral hands)  Neurological: She is alert and oriented to person, place, and time  Skin: Skin is warm and dry  Psychiatric: She has a normal mood and affect  Her behavior is normal  Judgment and thought content normal    Nursing note and vitals reviewed        Vital Signs  ED Triage Vitals   Temperature Pulse Respirations Blood Pressure SpO2   11/14/19 1223 11/14/19 1223 11/14/19 1223 11/14/19 1223 11/14/19 1223   98 4 °F (36 9 °C) 83 18 170/83 96 %      Temp Source Heart Rate Source Patient Position - Orthostatic VS BP Location FiO2 (%)   11/14/19 1223 11/14/19 1223 11/14/19 1431 11/14/19 1223 --   Oral Monitor Sitting Right arm       Pain Score       11/14/19 1223       9           Vitals:    11/14/19 1223 11/14/19 1431   BP: 170/83 146/79   Pulse: 83 85   Patient Position - Orthostatic VS:  Sitting         Visual Acuity      ED Medications  Medications   morphine (PF) 10 mg/mL injection 6 mg (6 mg Intravenous Given 11/14/19 1332)   ondansetron (ZOFRAN) injection 4 mg (4 mg Intravenous Given 11/14/19 1332)       Diagnostic Studies  Results Reviewed Procedure Component Value Units Date/Time    Sedimentation rate, automated [[id number]]  (Abnormal) Collected:  11/14/19 1331    Lab Status:  Final result Specimen:  Blood from Arm, Right Updated:  11/14/19 1447     Sed Rate 22 mm/hour     C-reactive protein [[id number]]  (Abnormal) Collected:  11/14/19 1331    Lab Status:  Final result Specimen:  Blood from Arm, Right Updated:  11/14/19 1434     CRP 24 1 mg/L     Comprehensive metabolic panel [[id number]]  (Abnormal) Collected:  11/14/19 1331    Lab Status:  Final result Specimen:  Blood from Arm, Right Updated:  11/14/19 1357     Sodium 140 mmol/L      Potassium 3 2 mmol/L      Chloride 102 mmol/L      CO2 28 mmol/L      ANION GAP 10 mmol/L      BUN 15 mg/dL      Creatinine 0 51 mg/dL      Glucose 128 mg/dL      Calcium 8 6 mg/dL      AST 19 U/L      ALT 31 U/L      Alkaline Phosphatase 90 U/L      Total Protein 6 8 g/dL      Albumin 3 2 g/dL      Total Bilirubin 0 60 mg/dL      eGFR 103 ml/min/1 73sq m     Narrative:       [facility] guidelines for Chronic Kidney Disease (CKD):     Stage 1 with normal or high GFR (GFR > 90 mL/min/1 73 square meters)    Stage 2 Mild CKD (GFR = 60-89 mL/min/1 73 square meters)    Stage 3A Moderate CKD (GFR = 45-59 mL/min/1 73 square meters)    Stage 3B Moderate CKD (GFR = 30-44 mL/min/1 73 square meters)    Stage 4 Severe CKD (GFR = 15-29 mL/min/1 73 square meters)    Stage 5 End Stage CKD (GFR <15 mL/min/1 73 square meters)  Note: GFR calculation is accurate only with a steady state creatinine    CBC and differential [[id number]]  (Abnormal) Collected:  11/14/19 1331    Lab Status:  Final result Specimen:  Blood from Arm, Right Updated:  11/14/19 1339     WBC 10 17 Thousand/uL      RBC 5 64 Million/uL      Hemoglobin 11 8 g/dL      Hematocrit 37 7 %      MCV 67 fL      MCH 20 9 pg      MCHC 31 3 g/dL      RDW 15 9 %      Platelets 766 Thousands/uL      nRBC 0 /100 WBCs      Neutrophils Relative 74 % Immat GRANS % 0 %      Lymphocytes Relative 18 %      Monocytes Relative 7 %      Eosinophils Relative 1 %      Basophils Relative 0 %      Neutrophils Absolute 7 57 Thousands/µL      Immature Grans Absolute 0 04 Thousand/uL      Lymphocytes Absolute 1 82 Thousands/µL      Monocytes Absolute 0 67 Thousand/µL      Eosinophils Absolute 0 05 Thousand/µL      Basophils Absolute 0 02 Thousands/µL                  No orders to display              Procedures  Procedures       ED Course                               MDM  Number of Diagnoses or Management Options  Bilateral hand swelling: new and requires workup  Polyarthralgia: new and requires workup  Diagnosis management comments: Patient with multiple areas of pain - some swelling  Suspect polymyalgia rheumatica however patient has been treated for this in the past without improvement  Additionally her testing for other rheumatologic disease has been negative  Patient took steroids without improvement  I will check markers and treat pain           Amount and/or Complexity of Data Reviewed  Clinical lab tests: ordered and reviewed    Risk of Complications, Morbidity, and/or Mortality  Presenting problems: high  Diagnostic procedures: high  Management options: high    Patient Progress  Patient progress: stable      Disposition  Final diagnoses:   Bilateral hand swelling   Polyarthralgia     Time reflects when diagnosis was documented in both MDM as applicable and the Disposition within this note     Time User Action Codes Description Comment    11/14/2019  3:10 PM Abbey Saravia Add [M79 89] Bilateral hand swelling     11/14/2019  3:11 PM Abbey Saravia Add [M25 50] Polyarthralgia     11/14/2019  3:11 PM Elease Lanes [M79 89] Bilateral hand swelling     11/14/2019  3:11 PM Abbey Armas [M25 50] [address] [place] [address]       ED Disposition     ED Disposition Condition Date/Time Comment    Discharge Stable u Nov 14, 2019  3:10 PM [facility] discharge to home/self care  Follow-up Information     Follow up With Specialties Details Why Contact Info    Malissa Greer MD Internal Medicine Schedule an appointment as soon as possible for a visit  As needed [place] [id number]             Patient's Medications   Discharge Prescriptions    OXYCODONE-ACETAMINOPHEN (PERCOCET) 5-325 MG PER TABLET    Take 1 tablet by mouth every 4 (four) hours as needed for moderate pain for up to 15 dosesMax Daily Amount: 6 tablets       Start Date: 11/14/2019End Date: --       Order Dose: 1 tablet       Quantity: 15 tablet    Refills: 0     No discharge procedures on file      ED Provider  Electronically Signed by           Erlin Briseno MD  11/14/19 8188

## 2020-02-19 ENCOUNTER — TELEPHONE (OUTPATIENT)
Dept: GASTROENTEROLOGY | Facility: AMBULARY SURGERY CENTER | Age: 65
End: 2020-02-19

## 2020-02-19 NOTE — TELEPHONE ENCOUNTER
Pt called   Scheduled for colonoscopy w/ dr Josselin Gotti at [id number] 4/17/2020/dulcolax/golytely ordered/instructions mailed

## 2020-02-20 DIAGNOSIS — Z12.11 SCREEN FOR COLON CANCER: Primary | ICD-10-CM

## 2020-04-03 ENCOUNTER — TELEPHONE (OUTPATIENT)
Dept: GASTROENTEROLOGY | Facility: AMBULARY SURGERY CENTER | Age: 65
End: 2020-04-03

## 2020-04-06 ENCOUNTER — TELEPHONE (OUTPATIENT)
Dept: GASTROENTEROLOGY | Facility: AMBULARY SURGERY CENTER | Age: 65
End: 2020-04-06

## 2020-06-26 ENCOUNTER — ANESTHESIA EVENT (OUTPATIENT)
Dept: GASTROENTEROLOGY | Facility: AMBULARY SURGERY CENTER | Age: 65
End: 2020-06-26

## 2020-07-06 DIAGNOSIS — Z11.59 SCREENING FOR VIRAL DISEASE: ICD-10-CM

## 2020-07-06 PROCEDURE — U0003 INFECTIOUS AGENT DETECTION BY NUCLEIC ACID (DNA OR RNA); SEVERE ACUTE RESPIRATORY SYNDROME CORONAVIRUS 2 (SARS-COV-2) (CORONAVIRUS DISEASE [COVID-19]), AMPLIFIED PROBE TECHNIQUE, MAKING USE OF HIGH THROUGHPUT TECHNOLOGIES AS DESCRIBED BY CMS-2020-01-R: HCPCS

## 2020-07-08 LAB
INPATIENT: NORMAL
SARS-COV-2 RNA SPEC QL NAA+PROBE: NOT DETECTED

## 2020-07-09 NOTE — ANESTHESIA PREPROCEDURE EVALUATION
Review of Systems/Medical History  Patient summary reviewed  Chart reviewed  No history of anesthetic complications     Cardiovascular  Hyperlipidemia, Hypertension ,    Pulmonary  Negative pulmonary ROS        GI/Hepatic    Liver disease , Hepatitis B and C,        Negative  ROS        Endo/Other  Diabetes ,      GYN       Hematology  Negative hematology ROS      Musculoskeletal  Rheumatoid arthritis ,   Arthritis     Neurology  Negative neurology ROS      Psychology   Anxiety, Depression ,              Physical Exam    Airway    Mallampati score: II  TM Distance: >3 FB  Neck ROM: full     Dental       Cardiovascular      Pulmonary      Other Findings        Anesthesia Plan  ASA Score- 2     Anesthesia Type- IV sedation with anesthesia with ASA Monitors  Additional Monitors:   Airway Plan:         Plan Factors-    Induction- intravenous  Postoperative Plan-     Informed Consent- Anesthetic plan and risks discussed with patient  I personally reviewed this patient with the CRNA  Discussed and agreed on the Anesthesia Plan with the CRNA  Kaci Lin

## 2020-07-10 ENCOUNTER — ANESTHESIA (OUTPATIENT)
Dept: GASTROENTEROLOGY | Facility: AMBULARY SURGERY CENTER | Age: 65
End: 2020-07-10

## 2020-07-10 ENCOUNTER — HOSPITAL ENCOUNTER (OUTPATIENT)
Dept: GASTROENTEROLOGY | Facility: AMBULARY SURGERY CENTER | Age: 65
Setting detail: OUTPATIENT SURGERY
Discharge: HOME | End: 2020-07-10
Attending: INTERNAL MEDICINE | Admitting: INTERNAL MEDICINE
Payer: MEDICARE

## 2020-07-10 VITALS
HEIGHT: 61 IN | OXYGEN SATURATION: 99 % | BODY MASS INDEX: 29.27 KG/M2 | DIASTOLIC BLOOD PRESSURE: 74 MMHG | HEART RATE: 70 BPM | TEMPERATURE: 97.8 F | SYSTOLIC BLOOD PRESSURE: 124 MMHG | WEIGHT: 155 LBS | RESPIRATION RATE: 18 BRPM

## 2020-07-10 DIAGNOSIS — D12.6 TUBULOVILLOUS ADENOMA POLYP OF COLON: ICD-10-CM

## 2020-07-10 LAB — GLUCOSE SERPL-MCNC: 104 MG/DL (ref 65–140)

## 2020-07-10 PROCEDURE — 88305 TISSUE EXAM BY PATHOLOGIST: CPT | Performed by: PATHOLOGY

## 2020-07-10 PROCEDURE — 82948 REAGENT STRIP/BLOOD GLUCOSE: CPT

## 2020-07-10 PROCEDURE — 45385 COLONOSCOPY W/LESION REMOVAL: CPT | Performed by: INTERNAL MEDICINE

## 2020-07-10 RX ORDER — SODIUM CHLORIDE, SODIUM LACTATE, POTASSIUM CHLORIDE, CALCIUM CHLORIDE 600; 310; 30; 20 MG/100ML; MG/100ML; MG/100ML; MG/100ML
50 INJECTION, SOLUTION INTRAVENOUS CONTINUOUS
Status: DISCONTINUED | OUTPATIENT
Start: 2020-07-10 | End: 2020-07-14 | Stop reason: HOSPADM

## 2020-07-10 RX ORDER — PROPOFOL 10 MG/ML
INJECTION, EMULSION INTRAVENOUS AS NEEDED
Status: DISCONTINUED | OUTPATIENT
Start: 2020-07-10 | End: 2020-07-10 | Stop reason: SURG

## 2020-07-10 RX ORDER — PREDNISONE 10 MG/1
10 TABLET ORAL DAILY
COMMUNITY

## 2020-07-10 RX ORDER — SODIUM CHLORIDE 9 MG/ML
INJECTION, SOLUTION INTRAVENOUS CONTINUOUS PRN
Status: DISCONTINUED | OUTPATIENT
Start: 2020-07-10 | End: 2020-07-10 | Stop reason: SURG

## 2020-07-10 RX ADMIN — PROPOFOL 50 MG: 10 INJECTION, EMULSION INTRAVENOUS at 09:02

## 2020-07-10 RX ADMIN — SODIUM CHLORIDE: 0.9 INJECTION, SOLUTION INTRAVENOUS at 08:53

## 2020-07-10 RX ADMIN — PROPOFOL 50 MG: 10 INJECTION, EMULSION INTRAVENOUS at 08:53

## 2020-07-10 RX ADMIN — PROPOFOL 50 MG: 10 INJECTION, EMULSION INTRAVENOUS at 08:55

## 2020-07-10 NOTE — ANESTHESIA POSTPROCEDURE EVALUATION
Post-Op Assessment Note    CV Status:  Stable  Pain Score: 0    Pain management: adequate     Mental Status:  Alert   Hydration Status:  Stable   PONV Controlled:  Controlled   Airway Patency:  Patent   Post Op Vitals Reviewed: Yes      Staff: CRNA           BP     Temp      Pulse     Resp      SpO2

## 2020-07-10 NOTE — H&P
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  Review of Systems    Historical Information   Past Medical History:   Diagnosis Date    Anxiety     Arthritis     Depression     Diabetes mellitus ([facility] )     diet controlled    Glaucoma     Hyperlipidemia     Hypertension     Panic disorder     RA (rheumatoid arthritis) ([place] [id number] )      Past Surgical History:   Procedure Laterality Date    BAND HEMORRHOIDECTOMY      CHOLECYSTECTOMY      [place] COLONOSCOPY FLX DX W/COLLJ SPEC WHEN PFRMD N/A 3/6/2017    Procedure: COLONOSCOPY;  Surgeon: Katalina Calle MD;  [facility]; Service: Gastroenterology    NV COLONOSCOPY FLX DX W/COLLJ SPEC WHEN PFRMD N/A 2016    Procedure: COLONOSCOPY;  Surgeon: Katalina Calle MD;  [facility]; Service: Gastroenterology    NV COLONOSCOPY FLX DX W/COLLJ SPEC WHEN PFRMD N/A 2016    Procedure: COLONOSCOPY;  Surgeon: Katalina Calle MD;  [facility]; Service: Gastroenterology    TUBAL LIGATION       Social History   Social History     Substance and Sexual Activity   Alcohol Use No     Social History     Substance and Sexual Activity   Drug Use No     Social History     Tobacco Use   Smoking Status Former Smoker    Last attempt to quit: [address] Years since quittin 5   Smokeless Tobacco Never Used     No family history on file  Meds/Allergies       (Not in a hospital admission)    Allergies   Allergen Reactions    Lisinopril Cough       Objective     There were no vitals taken for this visit  PHYSICAL EXAM    Gen: NAD  CV: RRR  CHEST: Clear  ABD: soft, NT/ND  EXT: no edema  Neuro: AAO      ASSESSMENT/PLAN:  This is a 60y o  year old female here for 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

## 2020-07-16 ENCOUNTER — TELEPHONE (OUTPATIENT)
Dept: GASTROENTEROLOGY | Facility: MEDICAL CENTER | Age: 65
End: 2020-07-16

## 2020-07-16 NOTE — TELEPHONE ENCOUNTER
----- Message from Porsha Franklin MD sent at 7/16/2020  7:34 AM EDT -----  Please inform the patient that the polyps removed were hyperplastic polyps, these have no cancer potential     He given the history of advanced polyps in the past, as we discussed I recommend repeat colonoscopy in 5 years  Please put in for 5 year colonoscopy recall

## 2021-02-16 ENCOUNTER — APPOINTMENT (EMERGENCY)
Dept: CT IMAGING | Facility: HOSPITAL | Age: 66
End: 2021-02-16
Payer: MEDICARE

## 2021-02-16 ENCOUNTER — HOSPITAL ENCOUNTER (EMERGENCY)
Facility: HOSPITAL | Age: 66
Discharge: HOME | End: 2021-02-16
Attending: EMERGENCY MEDICINE | Admitting: EMERGENCY MEDICINE
Payer: MEDICARE

## 2021-02-16 VITALS
SYSTOLIC BLOOD PRESSURE: 133 MMHG | DIASTOLIC BLOOD PRESSURE: 63 MMHG | HEART RATE: 82 BPM | RESPIRATION RATE: 16 BRPM | TEMPERATURE: 98.5 F | OXYGEN SATURATION: 97 %
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
## 2021-02-16 DIAGNOSIS — J18.9 ATYPICAL PNEUMONIA: ICD-10-CM

## 2021-02-16 DIAGNOSIS — S09.90XA INJURY OF HEAD, INITIAL ENCOUNTER: ICD-10-CM

## 2021-02-16 LAB
ANION GAP SERPL CALCULATED.3IONS-SCNC: 9 MMOL/L (ref 4–13)
BASOPHILS # BLD AUTO: 0.02 THOUSANDS/ΜL (ref 0–0.1)
BASOPHILS NFR BLD AUTO: 0 % (ref 0–1)
BUN SERPL-MCNC: 13 MG/DL (ref 5–25)
CALCIUM SERPL-MCNC: 9 MG/DL (ref 8.3–10.1)
CHLORIDE SERPL-SCNC: 105 MMOL/L (ref 100–108)
CO2 SERPL-SCNC: 27 MMOL/L (ref 21–32)
CREAT SERPL-MCNC: 0.82 MG/DL (ref 0.6–1.3)
EOSINOPHIL # BLD AUTO: 0.01 THOUSAND/ΜL (ref 0–0.61)
EOSINOPHIL NFR BLD AUTO: 0 % (ref 0–6)
ERYTHROCYTE [DISTWIDTH] IN BLOOD BY AUTOMATED COUNT: 17 % (ref 11.6–15.1)
GFR SERPL CREATININE-BSD FRML MDRD: 76 ML/MIN/1.73SQ M
GLUCOSE SERPL-MCNC: 160 MG/DL (ref 65–140)
HCT VFR BLD AUTO: 42.7 % (ref 34.8–46.1)
HGB BLD-MCNC: 13.3 G/DL (ref 11.5–15.4)
IMM GRANULOCYTES # BLD AUTO: 0.01 THOUSAND/UL (ref 0–0.2)
IMM GRANULOCYTES NFR BLD AUTO: 0 % (ref 0–2)
LYMPHOCYTES # BLD AUTO: 1.89 THOUSANDS/ΜL (ref 0.6–4.47)
LYMPHOCYTES NFR BLD AUTO: 42 % (ref 14–44)
MCH RBC QN AUTO: 20.9 PG (ref 26.8–34.3)
MCHC RBC AUTO-ENTMCNC: 31.1 G/DL (ref 31.4–37.4)
MCV RBC AUTO: 67 FL (ref 82–98)
MONOCYTES # BLD AUTO: 0.56 THOUSAND/ΜL (ref 0.17–1.22)
MONOCYTES NFR BLD AUTO: 13 % (ref 4–12)
NEUTROPHILS # BLD AUTO: 2 THOUSANDS/ΜL (ref 1.85–7.62)
NEUTS SEG NFR BLD AUTO: 45 % (ref 43–75)
NRBC BLD AUTO-RTO: 0 /100 WBCS
PLATELET # BLD AUTO: 152 THOUSANDS/UL (ref 149–390)
POTASSIUM SERPL-SCNC: 3.5 MMOL/L (ref 3.5–5.3)
RBC # BLD AUTO: 6.35 MILLION/UL (ref 3.81–5.12)
SODIUM SERPL-SCNC: 141 MMOL/L (ref 136–145)
WBC # BLD AUTO: 4.49 THOUSAND/UL (ref 4.31–10.16)

## 2021-02-16 PROCEDURE — 96361 HYDRATE IV INFUSION ADD-ON: CPT

## 2021-02-16 PROCEDURE — 36415 COLL VENOUS BLD VENIPUNCTURE: CPT | Performed by: EMERGENCY MEDICINE

## 2021-02-16 PROCEDURE — 71260 CT THORAX DX C+: CPT

## 2021-02-16 PROCEDURE — 85025 COMPLETE CBC W/AUTO DIFF WBC: CPT | Performed by: EMERGENCY MEDICINE

## 2021-02-16 PROCEDURE — G1004 CDSM NDSC: HCPCS

## 2021-02-16 PROCEDURE — 72125 CT NECK SPINE W/O DYE: CPT

## 2021-02-16 PROCEDURE — 87635 SARS-COV-2 COVID-19 AMP PRB: CPT | Performed by: EMERGENCY MEDICINE

## 2021-02-16 PROCEDURE — 80048 BASIC METABOLIC PNL TOTAL CA: CPT | Performed by: EMERGENCY MEDICINE

## 2021-02-16 PROCEDURE — 99284 EMERGENCY DEPT VISIT MOD MDM: CPT

## 2021-02-16 PROCEDURE — 96360 HYDRATION IV INFUSION INIT: CPT

## 2021-02-16 PROCEDURE — 99284 EMERGENCY DEPT VISIT MOD MDM: CPT | Performed by: EMERGENCY MEDICINE

## 2021-02-16 PROCEDURE — 70450 CT HEAD/BRAIN W/O DYE: CPT

## 2021-02-16 RX ORDER — MULTIVIT WITH MINERALS/LUTEIN
1000 TABLET ORAL EVERY 12 HOURS
Qty: 14 TABLET | Refills: 0 | Status: SHIPPED | OUTPATIENT
Start: 2021-02-16 | End: 2021-02-23

## 2021-02-16 RX ORDER — MELATONIN
2000 DAILY
Qty: 14 TABLET | Refills: 0 | Status: SHIPPED | OUTPATIENT
Start: 2021-02-16 | End: 2021-02-23

## 2021-02-16 RX ORDER — DOXYCYCLINE HYCLATE 100 MG/1
100 CAPSULE ORAL ONCE
Status: COMPLETED | OUTPATIENT
Start: 2021-02-16 | End: 2021-02-16

## 2021-02-16 RX ORDER — DOXYCYCLINE HYCLATE 100 MG/1
100 CAPSULE ORAL 2 TIMES DAILY
Qty: 14 CAPSULE | Refills: 0 | Status: SHIPPED | OUTPATIENT
Start: 2021-02-16 | End: 2021-02-23

## 2021-02-16 RX ORDER — MULTIVITAMIN
1 TABLET ORAL DAILY
Qty: 7 TABLET | Refills: 0 | Status: SHIPPED | OUTPATIENT
Start: 2021-02-16 | End: 2021-02-23

## 2021-02-16 RX ADMIN — SODIUM CHLORIDE 500 ML: 0.9 INJECTION, SOLUTION INTRAVENOUS at 15:24

## 2021-02-16 RX ADMIN — DOXYCYCLINE 100 MG: 100 CAPSULE ORAL at 17:44

## 2021-02-16 RX ADMIN — IOHEXOL 85 ML: 350 INJECTION, SOLUTION INTRAVENOUS at 16:20

## 2021-02-16 NOTE — ED PROVIDER NOTES
Emergency Department Trauma Note  Rock Morton 60 y o  female MRN: [id number]  Unit/Bed#: FT 04/FT 04 Encounter: [id number]      Trauma Alert: Trauma Acuity: C  Model of Arrival:   via    Trauma Team: Current Providers  Attending Provider: Ivory De Santiago MD  Registered Nurse: Essence Salazar RN  Consultants: None      History of Present Illness     Chief Complaint:   Chief Complaint   Patient presents with    Fall     Pt  was drying off outside of tub and pt  lost balance falling backwards and striking head on ceramic bar soap dish  Pt  Pt  takes ASA a "few times per week " Pt  reports dizziness  Pt  reports left neck and left shoulder pain     HPI:  Rock Morton is a 60 y o  female who presents with fall  Patient slipped, hit back of her head on a bathtub  She takes aspirin several times per week  No LOC  Describes left-sided pain  Describes generalized fatigue past couple days  Denies any chest pain, shortness of breath  Reports cough  Denies fever     Mechanism:Details of Incident: Fall, dizziness/slip head injury, on asa Injury Date: 02/16/21 Injury Time: 1100        History provided by:  Patient   used: No    Fall  Mechanism of injury: fall    Injury location: Posterior head, left side of neck  Incident location:  Bathroom  Time since incident:  3 hours  Arrived directly from scene: yes    Fall:     Fall occurred: From standing outside of bathtub  Impact surface: bathtub  Associated symptoms: neck pain    Associated symptoms: no back pain, no chest pain, no headaches, no nausea and no vomiting    Risk factors comment:  Aspirin use several times per day    Review of Systems   Respiratory: Positive for cough  Cardiovascular: Negative for chest pain  Gastrointestinal: Negative for nausea and vomiting  Musculoskeletal: Positive for neck pain  Negative for back pain  Neurological: Negative for headaches  All other systems reviewed and are negative        Historical Information     Immunizations: There is no immunization history on file for this patient  Past Medical History:   Diagnosis Date    Anxiety     Arthritis     Colon polyp     Depression     Diabetes mellitus (HCC)     diet controlled    Glaucoma     Hyperlipidemia     Hypertension     Panic disorder     RA [facility]rheumatoid arthritis) ([facility] [address] )      No family history on file  Past Surgical History:   Procedure Laterality Date    BAND HEMORRHOIDECTOMY      CHOLECYSTECTOMY      AR COLONOSCOPY FLX DX W/COLLJ SPEC WHEN PFRMD N/A 3/6/2017    Procedure: COLONOSCOPY;  Surgeon: Katelynn Mclain MD;  [facility]; Service: Gastroenterology    AR COLONOSCOPY FLX DX W/COLLJ SPEC WHEN PFRMD N/A 2016    Procedure: COLONOSCOPY;  Surgeon: Katelynn Mclain MD;  [facility]; Service: Gastroenterology    AR COLONOSCOPY FLX DX W/COLLJ SPEC WHEN PFRMD N/A 2016    Procedure: COLONOSCOPY;  Surgeon: Katelynn Mclain MD;  [facility]; Service: Gastroenterology    TUBAL LIGATION       Social History     Tobacco Use    Smoking status: Former Smoker     Quit date:      Years since quittin 1    Smokeless tobacco: Never Used   Substance Use Topics    Alcohol use: No    Drug use: No     E-Cigarette/Vaping    E-Cigarette Use Never User      E-Cigarette/Vaping Substances       Family History: non-contributory    Meds/Allergies   Prior to Admission Medications   Prescriptions Last Dose Informant Patient Reported? Taking? LORazepam (ATIVAN) 0 5 mg tablet   Yes No   Si PO OD PRN   XELJANZ XR 11 MG TB24   Yes No   Sig: Take by mouth   atorvastatin (LIPITOR) 20 mg tablet   Yes No   Sig: Take 20 mg by mouth daily  bimatoprost (LUMIGAN) 0 01 % ophthalmic drops   Yes No   Si drop daily at bedtime     metFORMIN (GLUCOPHAGE) 1000 MG tablet  Self Yes No   Sig: Take 1,000 mg by mouth 2 (two) times a day with meals   predniSONE 10 mg tablet   Yes No   Sig: Take 10 mg by mouth daily   zolpidem (AMBIEN) 5 mg tablet   Yes No   Sig: Take 5 mg by mouth daily at bedtime as needed for sleep  Facility-Administered Medications: None       Allergies   Allergen Reactions    Lisinopril Cough       PHYSICAL EXAM    PE limited by: none    Objective   Vitals:   First set: Temperature: 98 5 °F (36 9 °C) (02/16/21 1500)  Pulse: 88 (02/16/21 1500)  Respirations: 16 (02/16/21 1500)  Blood Pressure: 142/72 (02/16/21 1500)  SpO2: 96 % (02/16/21 1500)    Primary Survey:   (A) Airway: Intact  (B) Breathing: equal  (C) Circulation: Pulses:   normal  (D) Disabliity:  GCS Total:  15  (E) Expose:  Completed    Secondary Survey: (Click on Physical Exam tab above)  Physical Exam  Vitals signs and nursing note reviewed  Constitutional:       General: She is not in acute distress  Appearance: She is well-developed  HENT:      Head: Normocephalic and atraumatic  Eyes:      Pupils: Pupils are equal, round, and reactive to light  Neck:      Musculoskeletal: Normal range of motion and neck supple  Cardiovascular:      Rate and Rhythm: Normal rate and regular rhythm  Heart sounds: Normal heart sounds  No murmur  Pulmonary:      Effort: Pulmonary effort is normal  No respiratory distress  Breath sounds: Normal breath sounds  No wheezing or rales  Abdominal:      General: Bowel sounds are normal  There is no distension  Palpations: Abdomen is soft  Tenderness: There is no abdominal tenderness  There is no guarding or rebound  Musculoskeletal: Normal range of motion  General: No deformity  Lymphadenopathy:      Cervical: No cervical adenopathy  Skin:     Capillary Refill: Capillary refill takes less than 2 seconds  Findings: No erythema or rash  Neurological:      Mental Status: She is alert and oriented to person, place, and time  Cranial Nerves: No cranial nerve deficit  Motor: No abnormal muscle tone        Coordination: Coordination normal       Comments: Normal cranial nerve exam   Normal strength and sensation in bilateral upper and lower extremities  Coordination, normal finger-to-nose, normal heel-to-shin   Psychiatric:         Behavior: Behavior normal          Cervical spine cleared by clinical criteria? No, imaging required    Invasive Devices     None                 Lab Results:   Results Reviewed     Procedure Component Value Units Date/Time    [facility] [[id number]] Collected: 02/16/21 1744    Lab Status:  In process Specimen: Nares from Nasopharyngeal Swab Updated: 02/16/21 5741    Basic metabolic panel [[id number]]  (Abnormal) Collected: 02/16/21 1516    Lab Status: Final result Specimen: Blood from Arm, Right Updated: 02/16/21 1534     Sodium 141 mmol/L      Potassium 3 5 mmol/L      Chloride 105 mmol/L      CO2 27 mmol/L      ANION GAP 9 mmol/L      BUN 13 mg/dL      Creatinine 0 82 mg/dL      Glucose 160 mg/dL      Calcium 9 0 mg/dL      eGFR 76 ml/min/1 73sq m     Narrative:      [place] guidelines for Chronic Kidney Disease (CKD):     Stage 1 with normal or high GFR (GFR > 90 mL/min/1 73 square meters)    Stage 2 Mild CKD (GFR = 60-89 mL/min/1 73 square meters)    Stage 3A Moderate CKD (GFR = 45-59 mL/min/1 73 square meters)    Stage 3B Moderate CKD (GFR = 30-44 mL/min/1 73 square meters)    Stage 4 Severe CKD (GFR = 15-29 mL/min/1 73 square meters)    Stage 5 End Stage CKD (GFR <15 mL/min/1 73 square meters)  Note: GFR calculation is accurate only with a steady state creatinine    CBC and differential [[id number]]  (Abnormal) Collected: 02/16/21 1516    Lab Status: Final result Specimen: Blood from Arm, Right Updated: 02/16/21 1526     WBC 4 49 Thousand/uL      RBC 6 35 Million/uL      Hemoglobin 13 3 g/dL      Hematocrit 42 7 %      MCV 67 fL      MCH 20 9 pg      MCHC 31 1 g/dL      RDW 17 0 %      Platelets 810 Thousands/uL      nRBC 0 /100 WBCs      Neutrophils Relative 45 %      Immat GRANS % 0 % Lymphocytes Relative 42 %      Monocytes Relative 13 %      Eosinophils Relative 0 %      Basophils Relative 0 %      Neutrophils Absolute 2 00 Thousands/µL      Immature Grans Absolute 0 01 Thousand/uL      Lymphocytes Absolute 1 89 Thousands/µL      Monocytes Absolute 0 56 Thousand/µL      Eosinophils Absolute 0 01 Thousand/µL      Basophils Absolute 0 02 Thousands/µL                  Imaging Studies:   Direct to CT: Yes  CT chest with contrast   Final Result by Mere Keating MD (02/16 1725)      Scattered groundglass opacities bilaterally throughout both lungs with an upper lobe predominance  Findings likely infectious or inflammatory and atypical infection such as Covid 19 pneumonia cannot be excluded  Workstation performed: [id number]         CT head without contrast   Final Result by James Toure MD (02/16 1534)      No acute intracranial abnormality  Workstation performed: [id number]         CT spine cervical without contrast   Final Result by James Toure MD (02/16 5715)      1  There is no evidence of cervical spine fracture or dislocation  2  There is a partially visualized opacity identified in the right lung apex  Whether this represents an area of infection such as viral pneumonia or partially visualized mass is unclear  Dedicated CT of the chest is recommended  The examination demonstrates a significant  finding and was documented as such in [facility] for liaison and referring practitioner immediate notification  The examination demonstrates a finding requiring imaging follow-up and was logged as such in [address] [place] [address] Rd                           Workstation performed: [id number]               Procedures  Procedures         ED Course           MDM  Number of Diagnoses or Management Options  Atypical pneumonia: new and requires workup  Fall, initial encounter: new and requires workup  Injury of head, initial encounter: new and requires workup  Diagnosis management comments: Level C trauma activation for fall, head strike on aspirin several times per week  Patient describes left-sided neck pain  Denies any focal extremity pain  Denies any chest pain  Reports losing her balance causing the fall  CT head cervical spine with no acute fracture  Basic labs unremarkable  Incidental finding on CT cervical spine revealed mass vsinfection in the lung for which CT of the chest was recommended by reading radiologist   Patient re-evaluated  She states she has had a cough for roughly 6 days  CT obtained in emergency department, revealed findings consistent with atypical pneumonia, COVID-19 not excluded  COVID test ordered  Patient immunosuppressed on chronic steroids due to history of polymyalgia rheumatica  Patient started on antibiotics to cover atypical pneumonia  Doxycycline in ER, 7 day course       Amount and/or Complexity of Data Reviewed  Clinical lab tests: ordered and reviewed  Tests in the radiology section of CPT®: ordered and reviewed  Tests in the medicine section of CPT®: ordered and reviewed    Risk of Complications, Morbidity, and/or Mortality  Presenting problems: high  Diagnostic procedures: moderate  Management options: high    Patient Progress  Patient progress: stable          Disposition  [facility] Transfer: No  Final diagnoses:   Fall, initial encounter   Injury of head, initial encounter   Atypical pneumonia     Time reflects when diagnosis was documented in both MDM as applicable and the Disposition within this note     Time User Action Codes Description Comment    2/16/2021  4:30 PM Carolyn Left Add [V71  ZULR] Fall, initial encounter     2/16/2021  4:30 PM Carolyn Left Add [U15 [id number]] Injury of head, initial encounter     2/16/2021  5:34 PM Carolyn Left Add [J18 9] Atypical pneumonia       ED Disposition     ED Disposition Condition Date/Time Comment    Discharge Stable Tue Feb 16, 2021  5:34 PM [facility] [place] discharge to home/self care             Follow-up Information     Follow up With Specialties Details Why Contact Info Additional Information    Zac Carolina MD Internal Medicine Schedule an appointment as soon as possible for a visit in 2 days As needed [address] [address]        [address] Emergency Department Emergency Medicine Go to  If symptoms worsen [address] [facility] [address] [facility] Emergency Department, [address], [place], [place], [address]        Discharge Medication List as of 2/16/2021  5:41 PM      START taking these medications    Details   Ascorbic Acid (vitamin C) 1000 MG tablet Take 1 tablet (1,000 mg total) by mouth every 12 (twelve) hours for 7 days, Starting Tue 2/16/2021, Until Tue 2/23/2021, Print      cholecalciferol (VITAMIN D3) 1,000 units tablet Take 2 tablets (2,000 Units total) by mouth daily for 7 days, Starting Tue 2/16/2021, Until Tue 2/23/2021, Print      doxycycline hyclate (VIBRAMYCIN) 100 mg capsule Take 1 capsule (100 mg total) by mouth 2 (two) times a day for 7 days, Starting Tue 2/16/2021, Until Tue 2/23/2021, Print      Multiple Vitamin (multivitamin) tablet Take 1 tablet by mouth daily for 7 days, Starting Tue 2/16/2021, Until Tue 2/23/2021, Print         CONTINUE these medications which have NOT CHANGED    Details   atorvastatin (LIPITOR) 20 mg tablet Take 20 mg by mouth daily  , Until Discontinued, Historical Med      bimatoprost (LUMIGAN) 0 01 % ophthalmic drops 1 drop daily at bedtime  , Until Discontinued, Historical Med      LORazepam (ATIVAN) 0 5 mg tablet 1 PO OD PRN, Historical Med      metFORMIN (GLUCOPHAGE) 1000 MG tablet Take 1,000 mg by mouth 2 (two) times a day with meals, Historical Med      predniSONE 10 mg tablet Take 10 mg by mouth daily, Historical Med      XELJANZ XR 11 MG TB24 Take by mouth, Historical Med      zolpidem (AMBIEN) 5 mg tablet Take 5 mg by mouth daily at bedtime as needed for sleep , Until Discontinued, Historical Med           No discharge procedures on file      PDMP Review     None          ED Provider  Electronically Signed by         Alexander Ybarra MD  02/16/21 5258

## 2021-02-16 NOTE — DISCHARGE INSTRUCTIONS

## 2021-02-16 NOTE — Clinical Note
Clark Sesay was seen and treated in our emergency department on 2/16/2021  Diagnosis:     Kathy Lundy  may return to work on return date  She may return on this date: 02/22/2021         If you have any questions or concerns, please don't hesitate to call        London Florentino MD    ______________________________           _______________          _______________  Hospital Representative                              Date                                Time

## 2021-02-17 LAB — SARS-COV-2 RNA RESP QL NAA+PROBE: POSITIVE

## 2021-02-17 NOTE — RESULT ENCOUNTER NOTE
Patient called at [phone]  Name date of birth use is positive patient identifiers  Aware the COVID test was positive  Will remain quarantine for the next 10 days and return to the emergency department for any difficulty breathing or new onset chest pain

## 2022-03-23 ENCOUNTER — OFFICE VISIT (OUTPATIENT)
Dept: GASTROENTEROLOGY | Facility: AMBULARY SURGERY CENTER | Age: 67
End: 2022-03-23
Payer: MEDICARE

## 2022-03-23 VITALS
WEIGHT: 172.8 LBS | BODY MASS INDEX: 32.62 KG/M2 | SYSTOLIC BLOOD PRESSURE: 138 MMHG | HEART RATE: 88 BPM | HEIGHT: 61 IN | OXYGEN SATURATION: 100 % | DIASTOLIC BLOOD PRESSURE: 92 MMHG

## 2022-03-23 DIAGNOSIS — R19.4 CHANGE IN BOWEL HABIT: ICD-10-CM

## 2022-03-23 DIAGNOSIS — D12.6 TUBULOVILLOUS ADENOMA POLYP OF COLON: Primary | ICD-10-CM

## 2022-03-23 PROCEDURE — 99213 OFFICE O/P EST LOW 20 MIN: CPT | Performed by: INTERNAL MEDICINE

## 2022-03-23 RX ORDER — DULOXETIN HYDROCHLORIDE 30 MG/1
30 CAPSULE, DELAYED RELEASE ORAL DAILY
COMMUNITY
Start: 2022-01-10

## 2022-03-23 RX ORDER — TOCILIZUMAB 180 MG/ML
INJECTION, SOLUTION SUBCUTANEOUS
COMMUNITY
Start: 2022-03-15

## 2022-03-23 RX ORDER — ATORVASTATIN CALCIUM 40 MG/1
TABLET, FILM COATED ORAL
COMMUNITY
Start: 2022-03-04

## 2022-03-23 RX ORDER — POTASSIUM CHLORIDE 750 MG/1
CAPSULE, EXTENDED RELEASE ORAL
COMMUNITY
Start: 2021-12-27

## 2022-03-23 RX ORDER — DULOXETIN HYDROCHLORIDE 60 MG/1
60 CAPSULE, DELAYED RELEASE ORAL DAILY
COMMUNITY
Start: 2022-02-02

## 2022-03-23 RX ORDER — VALSARTAN AND HYDROCHLOROTHIAZIDE 160; 12.5 MG/1; MG/1
1 TABLET, FILM COATED ORAL DAILY
COMMUNITY
Start: 2022-02-14

## 2022-03-23 RX ORDER — EMPAGLIFLOZIN 25 MG/1
25 TABLET, FILM COATED ORAL DAILY
COMMUNITY
Start: 2022-03-07

## 2022-03-23 NOTE — PROGRESS NOTES
[facility]  Lisseth Guerrero 73 y o  female MRN: [id number]            Assessment & Plan:    81-year-old female, history of rheumatologic disorder, now with sensation of incomplete evacuation, some leakage after bowel movements    1  Incomplete evacuation and leakage: Possibly secondary to hemorrhoids, she also has bowel movement about 2 to 3 times per week maybe constipation mediated   -recommended she take a daily fiber supplement such as Benefiber 1 tbsp daily as well as MiraLax daily  -we will have the patient follow up in 2 months time, if not improved we will then proceed with colonoscopy, she will need to have a 2 day bowel prep as she has had incomplete prep in the past   She will need to have a 2 day bowel prep with magnesium citrate in a full GoLYTELY bowel prep  -helpful she have resolution of symptoms  -otherwise next colonoscopy should be in [address] was seen today for advice only  Diagnoses and all orders for this visit:    Tubulovillous adenoma polyp of colon    Change in bowel habit            _____________________________________________________________        CC:  Incomplete evacuation    HPI:  Lisseth Guerrero is a 73 y  o female who is here for incomplete evacuation  This is a pleasant 81-year-old female, previously had history of rheumatoid arthritis, now diagnosed with PMR, reports that for the last 1 year she has had a sensation of incomplete evacuation  She notes that after she has a bowel movement she frequently has to wipe and there is stool in her perianal area  Sometimes she has a 2nd bowel movement this will evacuate her bowels completely and she will not have any further leakage  Her last colonoscopy 2020 was relatively unremarkable, she has a history of tubulovillous adenoma  Patient otherwise denies any significant complaints  Denies any nausea, vomiting, heartburn, dysphagia, melena, rectal bleeding  No new change in medication    She reports she takes 1 fiber supplement daily in tablet form  ROS:  The remainder of the ROS was negative except for the pertinent positives mentioned in HPI  Allergies: Lisinopril    Medications:   Current Outpatient Medications:     Actemra ACTPen 162 MG/0 9ML SOAJ, , Disp: , Rfl:     atorvastatin (LIPITOR) 40 mg tablet, TAKE 1 TABLET BY MOUTH EVERY DAY AT NIGHT, Disp: , Rfl:     bimatoprost (LUMIGAN) 0 01 % ophthalmic drops, 1 drop daily at bedtime  , Disp: , Rfl:     denosumab (PROLIA) 60 mg/mL, Inject 60 mg under the skin, Disp: , Rfl:     DULoxetine (CYMBALTA) 30 mg delayed release capsule, Take 30 mg by mouth daily, Disp: , Rfl:     DULoxetine (CYMBALTA) 60 mg delayed release capsule, Take 60 mg by mouth daily, Disp: , Rfl:     Jardiance 25 MG TABS, Take 25 mg by mouth daily, Disp: , Rfl:     LORazepam (ATIVAN) 0 5 mg tablet, 1 PO OD PRN, Disp: , Rfl:     potassium chloride (MICRO-K) 10 MEQ CR capsule, TAKE 1 CAPSULE (10 MEQ TOTAL) BY MOUTH 2 (TWO) TIMES A DAY , Disp: , Rfl:     valsartan-hydrochlorothiazide (DIOVAN-HCT) 160-12 5 MG per tablet, Take 1 tablet by mouth daily, Disp: , Rfl:     Ascorbic Acid (vitamin C) 1000 MG tablet, Take 1 tablet (1,000 mg total) by mouth every 12 (twelve) hours for 7 days, Disp: 14 tablet, Rfl: 0    atorvastatin (LIPITOR) 20 mg tablet, Take 20 mg by mouth daily   (Patient not taking: Reported on 3/23/2022 ), Disp: , Rfl:     cholecalciferol (VITAMIN D3) 1,000 units tablet, Take 2 tablets (2,000 Units total) by mouth daily for 7 days, Disp: 14 tablet, Rfl: 0    metFORMIN (GLUCOPHAGE) 1000 MG tablet, Take 1,000 mg by mouth 2 (two) times a day with meals (Patient not taking: Reported on 3/23/2022 ), Disp: , Rfl:     Multiple Vitamin (multivitamin) tablet, Take 1 tablet by mouth daily for 7 days, Disp: 7 tablet, Rfl: 0    predniSONE 10 mg tablet, Take 10 mg by mouth daily, Disp: , Rfl:     XELJANZ XR 11 MG TB24, Take by mouth, Disp: , Rfl:     zolpidem (AMBIEN) 5 mg tablet, Take 5 mg by mouth daily at bedtime as needed for sleep , Disp: , Rfl:     Past Medical History:   Diagnosis Date    Anxiety     Arthritis     Colon polyp     Depression     Diabetes mellitus ([facility] )     diet controlled    Glaucoma     Hyperlipidemia     Hypertension     Panic disorder     RA (rheumatoid arthritis) ([facility] )        Past Surgical History:   Procedure Laterality Date    BAND HEMORRHOIDECTOMY      CHOLECYSTECTOMY      CT COLONOSCOPY FLX DX W/COLLJ SPEC WHEN PFRMD N/A 3/6/2017    Procedure: COLONOSCOPY;  Surgeon: Hung Curry MD;  [facility]; Service: Gastroenterology    CT COLONOSCOPY FLX DX W/COLLJ SPEC WHEN PFRMD N/A 9/7/2016    Procedure: COLONOSCOPY;  Surgeon: Hung Curry MD;  [facility]; Service: Gastroenterology    CT COLONOSCOPY FLX DX W/COLLJ SPEC WHEN PFRMD N/A 6/28/2016    Procedure: COLONOSCOPY;  Surgeon: Hung Curry MD;  [facility]; Service: Gastroenterology    TUBAL LIGATION         No family history on file  reports that she quit smoking about 27 years ago  She has never used smokeless tobacco  She reports that she does not drink alcohol and does not use drugs        Physical Exam:    /92   Pulse 88   Ht 5' 1" (1 549 m)   Wt 78 4 kg (172 lb 12 8 oz)   SpO2 100%   BMI 32 65 kg/m²     Gen: wn/wd, NAD, healthy-appearing female  HEENT: anicteric, MMM, no cervical LAD  CVS: RRR, no m/r/g  CHEST: CTA b/l  ABD: +BS, soft, NT,ND, no hepatosplenomegaly  EXT: no c/c/e  NEURO: aaox3  SKIN: NO rashes

## 2022-03-23 NOTE — LETTER
March 23, 2022     Kim Minaya 85  [address]  [place] [address]    Patient: Jared Dan   YOB: 1955   Date of Visit: 3/23/2022       Dear Dr Marisela Sotelo: Thank you for referring Chioma Palmer to me for evaluation  Below are my notes for this consultation  If you have questions, please do not hesitate to call me  I look forward to following your patient along with you  Sincerely,        Rafia Enriquez MD        CC: No Recipients  Rafia Enriquez MD  3/23/2022 11:49 AM  Sign when Signing Visit  [address] [facility]  Jared Dan 73 y o  female MRN: [id number]            Assessment & Plan:    28-year-old female, history of rheumatologic disorder, now with sensation of incomplete evacuation, some leakage after bowel movements    1  Incomplete evacuation and leakage: Possibly secondary to hemorrhoids, she also has bowel movement about 2 to 3 times per week maybe constipation mediated   -recommended she take a daily fiber supplement such as Benefiber 1 tbsp daily as well as MiraLax daily  -we will have the patient follow up in 2 months time, if not improved we will then proceed with colonoscopy, she will need to have a 2 day bowel prep as she has had incomplete prep in the past   She will need to have a 2 day bowel prep with magnesium citrate in a full GoLYTELY bowel prep  -helpful she have resolution of symptoms  -otherwise next colonoscopy should be in [address] was seen today for advice only  Diagnoses and all orders for this visit:    Tubulovillous adenoma polyp of colon    Change in bowel habit            _____________________________________________________________        CC:  Incomplete evacuation    HPI:  Jared Dan is a 73 y  o female who is here for incomplete evacuation    This is a pleasant 28-year-old female, previously had history of rheumatoid arthritis, now diagnosed with PMR, reports that for the last 1 year she has had a sensation of incomplete evacuation  She notes that after she has a bowel movement she frequently has to wipe and there is stool in her perianal area  Sometimes she has a 2nd bowel movement this will evacuate her bowels completely and she will not have any further leakage  Her last colonoscopy 2020 was relatively unremarkable, she has a history of tubulovillous adenoma  Patient otherwise denies any significant complaints  Denies any nausea, vomiting, heartburn, dysphagia, melena, rectal bleeding  No new change in medication  She reports she takes 1 fiber supplement daily in tablet form  ROS:  The remainder of the ROS was negative except for the pertinent positives mentioned in HPI  Allergies: Lisinopril    Medications:   Current Outpatient Medications:     Actemra ACTPen 162 MG/0 9ML SOAJ, , Disp: , Rfl:     atorvastatin (LIPITOR) 40 mg tablet, TAKE 1 TABLET BY MOUTH EVERY DAY AT NIGHT, Disp: , Rfl:     bimatoprost (LUMIGAN) 0 01 % ophthalmic drops, 1 drop daily at bedtime  , Disp: , Rfl:     denosumab (PROLIA) 60 mg/mL, Inject 60 mg under the skin, Disp: , Rfl:     DULoxetine (CYMBALTA) 30 mg delayed release capsule, Take 30 mg by mouth daily, Disp: , Rfl:     DULoxetine (CYMBALTA) 60 mg delayed release capsule, Take 60 mg by mouth daily, Disp: , Rfl:     Jardiance 25 MG TABS, Take 25 mg by mouth daily, Disp: , Rfl:     LORazepam (ATIVAN) 0 5 mg tablet, 1 PO OD PRN, Disp: , Rfl:     potassium chloride (MICRO-K) 10 MEQ CR capsule, TAKE 1 CAPSULE (10 MEQ TOTAL) BY MOUTH 2 (TWO) TIMES A DAY , Disp: , Rfl:     valsartan-hydrochlorothiazide (DIOVAN-HCT) 160-12 5 MG per tablet, Take 1 tablet by mouth daily, Disp: , Rfl:     Ascorbic Acid (vitamin C) 1000 MG tablet, Take 1 tablet (1,000 mg total) by mouth every 12 (twelve) hours for 7 days, Disp: 14 tablet, Rfl: 0    atorvastatin (LIPITOR) 20 mg tablet, Take 20 mg by mouth daily   (Patient not taking: Reported on 3/23/2022 ), Disp: , Rfl:    cholecalciferol (VITAMIN D3) 1,000 units tablet, Take 2 tablets (2,000 Units total) by mouth daily for 7 days, Disp: 14 tablet, Rfl: 0    metFORMIN (GLUCOPHAGE) 1000 MG tablet, Take 1,000 mg by mouth 2 (two) times a day with meals (Patient not taking: Reported on 3/23/2022 ), Disp: , Rfl:     Multiple Vitamin (multivitamin) tablet, Take 1 tablet by mouth daily for 7 days, Disp: 7 tablet, Rfl: 0    predniSONE 10 mg tablet, Take 10 mg by mouth daily, Disp: , Rfl:     XELJANZ XR 11 MG TB24, Take by mouth, Disp: , Rfl:     zolpidem (AMBIEN) 5 mg tablet, Take 5 mg by mouth daily at bedtime as needed for sleep , Disp: , Rfl:     Past Medical History:   Diagnosis Date    Anxiety     Arthritis     Colon polyp     Depression     Diabetes mellitus ([place] [address] )     diet controlled    Glaucoma     Hyperlipidemia     Hypertension     Panic disorder     RA (rheumatoid arthritis) ([place] [address] )        Past Surgical History:   Procedure Laterality Date    BAND HEMORRHOIDECTOMY      CHOLECYSTECTOMY      NC COLONOSCOPY FLX DX W/COLLJ SPEC WHEN PFRMD N/A 3/6/2017    Procedure: COLONOSCOPY;  Surgeon: Danyelle Waddell MD;  [facility]; Service: Gastroenterology    NC COLONOSCOPY FLX DX W/COLLJ SPEC WHEN PFRMD N/A 9/7/2016    Procedure: COLONOSCOPY;  Surgeon: Danyelle Waddell MD;  [facility]; Service: Gastroenterology    NC COLONOSCOPY FLX DX W/COLLJ SPEC WHEN PFRMD N/A 6/28/2016    Procedure: COLONOSCOPY;  Surgeon: Danyelle Waddell MD;  [facility]; Service: Gastroenterology    TUBAL LIGATION         No family history on file  reports that she quit smoking about 27 years ago  She has never used smokeless tobacco  She reports that she does not drink alcohol and does not use drugs        Physical Exam:    /92   Pulse 88   Ht 5' 1" (1 549 m)   Wt 78 4 kg (172 lb 12 8 oz)   SpO2 100%   BMI 32 65 kg/m²     Gen: wn/wd, NAD, healthy-appearing female  HEENT: anicteric, MMM, no cervical LAD  CVS: RRR, no m/r/g  CHEST: CTA b/l  ABD: +BS, soft, NT,ND, no hepatosplenomegaly  EXT: no c/c/e  NEURO: aaox3  SKIN: NO rashes

## 2022-04-30 NOTE — MISCELLANEOUS
Message  Patient called and stated that she is having difficulty with constipation  Patient also states that when she takes Miralax she is no longer constipated, but would like to know if she can continue to take it  Per Dr Garcia [place], patient should continue taking Miralax and follow up with office should symptoms worsen  KFA      Active Problems    1  Anxiety (300 00) (F41 9)   2  Arthropathy of cervical spine (716 98) (M46 92)   3  Cervical radicular pain (723 4) (M54 12)   4  Colon cancer screening (V76 51) (Z12 11)   5  Depression (311) (F32 9)   6  Diabetes mellitus (250 00) (E11 9)   7  Drug-induced thrombocytopenia (287 49,E980 5) (D69 59)   8  Encounter for monitoring rituximab therapy (V58 83,V58 69) (Z51 81,Z79 899)   9  Glaucoma (365 9) (H40 9)   10  Hepatitis B antibody positive (795 79) (R76 8)   11  History of rheumatoid arthritis (V13 4) (Z87 39)   12  Hyperlipidemia (272 4) (E78 5)   13  Hypertension (401 9) (I10)   14  Hypoestrogenism (256 39) (E28 39)   15  Lung nodules (793 19) (R91 8)   16  Mediterranean anemia (282 40) (D56 9)   17  Myofascial pain (729 1) (M79 1)   18  Neck pain (723 1) (M54 2)   19  Rheumatoid arthritis of multiple sites with negative rheumatoid factor (714 0) (M06 09)   20  Skin cancer, basal cell (173 91) (C44 91)   21  Spondylosis of cervical region without myelopathy or radiculopathy (721 0) (M47 812)   22  Thrombocytopenia (287 5) (D69 6)   23  Visit for routine gyn exam (V72 31) (Z01 419)    Current Meds   1  Allegra 180 MG TABS (Fexofenadine HCl); TAKE 1 TABLET DAILY; Therapy: (Recorded:02Wld7004) to Recorded   2  Atorvastatin Calcium 20 MG Oral Tablet; TAKE 1 TABLET DAILY AT BEDTIME; Therapy: 28Xaa6866 to (Evaluate:69Job2015) Recorded   3  Folic Acid 1 MG Oral Tablet; TAKE 1 TABLET DAILY; Therapy: (Recorded:70Xck4390) to Recorded   4  Humira Pen 40 MG/0 8ML Subcutaneous Pen-injector Kit; Inject 40 mg SC every other   week;    Therapy: 75IOL6031 to (Evaluate:10Aug2016)  Requested for: 27FQK1050; Last   Rx:74Lpn7031 Ordered   5  Hydrochlorothiazide 12 5 MG Oral Capsule; Therapy: (Recorded:12Apr2016) to Recorded   6  Lexapro 20 MG Oral Tablet (Escitalopram Oxalate); TAKE 1 TABLET DAILY; Therapy: 04Apr2015 to (Evaluate:21Jun2015) Recorded   7  Lumigan 0 01 % Ophthalmic Solution; INSTILL 1 DROP INTO BOTH EYES ONCE DAILY   IN THE EVENING; Therapy: 21WPI3946 to (Evaluate:20Aug2015) Recorded   8  TiZANidine HCl - 4 MG Oral Tablet; TAKE 1/2 TO 1 TABLET 3 TIMES DAILY AS NEEDED    Requested for: 11Apr2016; Last Rx:11Apr2016 Ordered   9  Vitamin D3 CAPS; take 1 capsule daily; Therapy: (Recorded:42Dri9369) to Recorded    Allergies    1   No Known Drug Allergies    Signatures   Electronically signed by : Talia Freitas, ; Aug 18 2016  3:05PM EST                       (Author) <-- Click to add NO pertinent Family History

## 2022-05-27 ENCOUNTER — TELEPHONE (OUTPATIENT)
Dept: GASTROENTEROLOGY | Facility: AMBULARY SURGERY CENTER | Age: 67
End: 2022-05-27

## 2023-04-24 NOTE — Clinical Note
Markell Nathan was seen and treated in our emergency department on 2/16/2021  Diagnosis:     Theo Briceño  may return to work on return date  She may return on this date: 02/22/2021    If you have no fever, cough, chills or signs of COVID-19  If you have any questions or concerns, please don't hesitate to call        Leonard Mckinney MD    ______________________________           _______________          _______________  Hospital Representative                              Date                                Time Primary Defect Length In Cm (Final Defect Size - Required For Flaps/Grafts): 4.9

## 2023-04-25 ENCOUNTER — TELEPHONE (OUTPATIENT)
Dept: GASTROENTEROLOGY | Facility: CLINIC | Age: 68
End: 2023-04-25

## 2023-04-25 NOTE — TELEPHONE ENCOUNTER
Patient left  stating she would like to schedule a colonoscopy  Patient is not due until 2025 and will need an OV first  Can you please call to set one up? Thanks!

## 2023-04-26 ENCOUNTER — OFFICE VISIT (OUTPATIENT)
Dept: GASTROENTEROLOGY | Facility: AMBULARY SURGERY CENTER | Age: 68
End: 2023-04-26

## 2023-04-26 VITALS
BODY MASS INDEX: 30.66 KG/M2 | WEIGHT: 162.4 LBS | SYSTOLIC BLOOD PRESSURE: 142 MMHG | HEART RATE: 95 BPM | OXYGEN SATURATION: 98 % | DIASTOLIC BLOOD PRESSURE: 92 MMHG | HEIGHT: 61 IN

## 2023-04-26 DIAGNOSIS — R19.4 CHANGE IN BOWEL HABIT: ICD-10-CM

## 2023-04-26 DIAGNOSIS — D12.6 TUBULOVILLOUS ADENOMA POLYP OF COLON: Primary | ICD-10-CM

## 2023-04-26 RX ORDER — HYDROCORTISONE 25 MG/G
CREAM TOPICAL 2 TIMES DAILY
Qty: 30 G | Refills: 1 | Status: SHIPPED | OUTPATIENT
Start: 2023-04-26

## 2023-04-26 RX ORDER — ALPRAZOLAM 0.25 MG/1
TABLET ORAL
COMMUNITY
Start: 2023-04-18

## 2023-04-26 RX ORDER — UPADACITINIB 15 MG/1
TABLET, EXTENDED RELEASE ORAL
COMMUNITY
Start: 2023-04-17

## 2023-04-26 NOTE — PROGRESS NOTES
[facility]) [facility]  [facility] 78 y o  female MRN: [id number]            Assessment & Plan:  Robert 78-year-old female with a history of tubulovillous adenoma with high-grade dysplasia in 2016  Now with intermittent fecal leakage  1   Intermittent fecal leakage: Mostly secondary to internal hemorrhoids, irregular stools  -Recommended daily fiber supplement next-trial of Anusol rectal cream    2  History of tubulovillous adenoma with high-grade dysplasia: We will schedule colonoscopy, last examination was 3 years ago  -Discussed with the risks of procedure including bleeding, surgery, perforation, missed polyp detection rate      Delvin Parker was seen today for follow-up  Diagnoses and all orders for this visit:    Tubulovillous adenoma polyp of colon  -     Colonoscopy; Future    Change in bowel habit  -     hydrocortisone (ANUSOL-HC) 2 5 % rectal cream; Apply topically 2 (two) times a day Apply rectally, insert into rectum  -     Colonoscopy; Future    Other orders  -     Diet NPO; Sips with meds; Standing  -     Void on call to OR; Standing            _____________________________________________________________        CC: Anal leakage anal leakage  Robert 78-year-old female with a longstanding history of irregular stools, has regular bowel moods for several days and no bowel movement for 3 to 4 days, has leakage of white after she wipes  Had similar symptoms about 1 or 2 years ago but did not follow-up  At that time her mother had passed away, her  was diagnosed with cancer  She denies any nausea, vomiting, heartburn  Denies any abdominal pain  Her weight has been stable  HPI:  [facility] is a 78 y  o female who is here for Robert 78-year-old female with a longstanding history of irregular stools, has regular bowel moods for several days and no bowel movement for 3 to 4 days, has leakage of white after she wipes    Had similar symptoms about 1 or 2 years ago but did not follow-up  At that time her mother had passed away, her  was diagnosed with cancer  She denies any nausea, vomiting, heartburn  Denies any abdominal pain  Her weight has been stable  Had recommended fiber supplements previously which she did not take  ROS:  The remainder of the ROS was negative except for the pertinent positives mentioned in HPI  Allergies: Lisinopril    Medications:   Current Outpatient Medications:   •  ALPRAZolam (XANAX) 0 25 mg tablet, TAKE 1 TABLET BY MOUTH NIGHTLY AS NEEDED FOR ANXIETY, Disp: , Rfl:   •  atorvastatin (LIPITOR) 40 mg tablet, TAKE 1 TABLET BY MOUTH EVERY DAY AT NIGHT, Disp: , Rfl:   •  bimatoprost (LUMIGAN) 0 01 % ophthalmic drops, 1 drop daily at bedtime  , Disp: , Rfl:   •  cholecalciferol (VITAMIN D3) 1,000 units tablet, Take 2 tablets (2,000 Units total) by mouth daily for 7 days, Disp: 14 tablet, Rfl: 0  •  denosumab (PROLIA) 60 mg/mL, Inject 60 mg under the skin, Disp: , Rfl:   •  DULoxetine (CYMBALTA) 30 mg delayed release capsule, Take 30 mg by mouth daily, Disp: , Rfl:   •  DULoxetine (CYMBALTA) 60 mg delayed release capsule, Take 60 mg by mouth daily, Disp: , Rfl:   •  hydrocortisone (ANUSOL-HC) 2 5 % rectal cream, Apply topically 2 (two) times a day Apply rectally, insert into rectum, Disp: 30 g, Rfl: 1  •  Jardiance 25 MG TABS, Take 25 mg by mouth daily, Disp: , Rfl:   •  LORazepam (ATIVAN) 0 5 mg tablet, 1 PO OD PRN, Disp: , Rfl:   •  potassium chloride (MICRO-K) 10 MEQ CR capsule, TAKE 1 CAPSULE (10 MEQ TOTAL) BY MOUTH 2 (TWO) TIMES A DAY , Disp: , Rfl:   •  Rinvoq 15 MG TB24, , Disp: , Rfl:   •  valsartan-hydrochlorothiazide (DIOVAN-HCT) 160-12 5 MG per tablet, Take 1 tablet by mouth daily, Disp: , Rfl:   •  zolpidem (AMBIEN) 5 mg tablet, Take 5 mg by mouth daily at bedtime as needed for sleep , Disp: , Rfl:   •  Actemra ACTPen 162 MG/0 9ML SOAJ, , Disp: , Rfl:   •  Ascorbic Acid (vitamin C) 1000 MG tablet, Take 1 tablet (1,000 "mg total) by mouth every 12 (twelve) hours for 7 days, Disp: 14 tablet, Rfl: 0  •  atorvastatin (LIPITOR) 20 mg tablet, Take 20 mg by mouth daily  (Patient not taking: Reported on 3/23/2022 ), Disp: , Rfl:   •  metFORMIN (GLUCOPHAGE) 1000 MG tablet, Take 1,000 mg by mouth 2 (two) times a day with meals (Patient not taking: Reported on 3/23/2022 ), Disp: , Rfl:   •  Multiple Vitamin (multivitamin) tablet, Take 1 tablet by mouth daily for 7 days, Disp: 7 tablet, Rfl: 0  •  predniSONE 10 mg tablet, Take 10 mg by mouth daily, Disp: , Rfl:   •  XELJANZ XR 11 MG TB24, Take by mouth, Disp: , Rfl:     Past Medical History:   Diagnosis Date   • Anxiety    • Arthritis    • Colon polyp    • Depression    • Diabetes mellitus ([facility] )     diet controlled   • Glaucoma    • Hyperlipidemia    • Hypertension    • Panic disorder    • RA (rheumatoid arthritis) ([place] [address] )        Past Surgical History:   Procedure Laterality Date   • BAND HEMORRHOIDECTOMY     • CHOLECYSTECTOMY     • PA COLONOSCOPY FLX DX W/COLLJ SPEC WHEN PFRMD N/A 3/6/2017    Procedure: COLONOSCOPY;  Surgeon: Bobbi Hart MD;  [facility]; Service: Gastroenterology   • PA COLONOSCOPY FLX DX W/COLLJ SPEC WHEN PFRMD N/A 9/7/2016    Procedure: COLONOSCOPY;  Surgeon: Bobbi Hart MD;  [facility]; Service: Gastroenterology   • PA COLONOSCOPY FLX DX W/COLLJ SPEC WHEN PFRMD N/A 6/28/2016    Procedure: COLONOSCOPY;  Surgeon: Bobbi Hart MD;  [facility]; Service: Gastroenterology   • TUBAL LIGATION         History reviewed  No pertinent family history  reports that she quit smoking about 28 years ago  She has never used smokeless tobacco  She reports that she does not drink alcohol and does not use drugs        Physical Exam:    /92 (BP Location: Right arm, Patient Position: Sitting, Cuff Size: Standard)   Pulse 95   Ht 5' 1\" (1 549 m)   Wt 73 7 kg (162 lb 6 4 oz)   SpO2 98%   BMI 30 69 kg/m²     Gen: wn/wd, NAD, healthy-appearing " female  HEENT: anicteric, MMM, no cervical LAD  CVS: RRR, no m/r/g  CHEST: CTA b/l  ABD: +BS, soft, NT,ND, no hepatosplenomegaly  EXT: no c/c/e  NEURO: aaox3  SKIN: NO rashes

## 2023-04-26 NOTE — LETTER
April 26, 2023     Kim Junior 85  [address]  [address] [place] [address]    Patient: Elsy Poon   YOB: 1955   Date of Visit: 4/26/2023       Dear Dr Rader Patch: Thank you for referring Hai Thompson to me for evaluation  Below are my notes for this consultation  If you have questions, please do not hesitate to call me  I look forward to following your patient along with you  Sincerely,        Ivette Bradley MD        CC: No Recipients  Ivette Bradley MD  4/26/2023  4:55 PM  Sign when Signing Visit  [address] [facility]  Elsy Poon 78 y o  female MRN: [id number]            Assessment & Plan:  Pleasant 52-year-old female with a history of tubulovillous adenoma with high-grade dysplasia in 2016  Now with intermittent fecal leakage  1   Intermittent fecal leakage: Mostly secondary to internal hemorrhoids, irregular stools  -Recommended daily fiber supplement next-trial of Anusol rectal cream    2  History of tubulovillous adenoma with high-grade dysplasia: We will schedule colonoscopy, last examination was 3 years ago  -Discussed with the risks of procedure including bleeding, surgery, perforation, missed polyp detection rate      Nabila Montes was seen today for follow-up  Diagnoses and all orders for this visit:    Tubulovillous adenoma polyp of colon  -     Colonoscopy; Future    Change in bowel habit  -     hydrocortisone (ANUSOL-HC) 2 5 % rectal cream; Apply topically 2 (two) times a day Apply rectally, insert into rectum  -     Colonoscopy; Future    Other orders  -     Diet NPO; Sips with meds; Standing  -     Void on call to OR; Standing            _____________________________________________________________        CC: Anal leakage anal leakage  Pleasant 52-year-old female with a longstanding history of irregular stools, has regular bowel moods for several days and no bowel movement for 3 to 4 days, has leakage of white after she wipes    Had similar symptoms about 1 or 2 years ago but did not follow-up  At that time her mother had passed away, her  was diagnosed with cancer  She denies any nausea, vomiting, heartburn  Denies any abdominal pain  Her weight has been stable  HPI:  Magdi Platt is a 78 y  o female who is here for Pleasant 67-year-old female with a longstanding history of irregular stools, has regular bowel moods for several days and no bowel movement for 3 to 4 days, has leakage of white after she wipes  Had similar symptoms about 1 or 2 years ago but did not follow-up  At that time her mother had passed away, her  was diagnosed with cancer  She denies any nausea, vomiting, heartburn  Denies any abdominal pain  Her weight has been stable  Had recommended fiber supplements previously which she did not take  ROS:  The remainder of the ROS was negative except for the pertinent positives mentioned in HPI  Allergies: Lisinopril    Medications:   Current Outpatient Medications:   •  ALPRAZolam (XANAX) 0 25 mg tablet, TAKE 1 TABLET BY MOUTH NIGHTLY AS NEEDED FOR ANXIETY, Disp: , Rfl:   •  atorvastatin (LIPITOR) 40 mg tablet, TAKE 1 TABLET BY MOUTH EVERY DAY AT NIGHT, Disp: , Rfl:   •  bimatoprost (LUMIGAN) 0 01 % ophthalmic drops, 1 drop daily at bedtime  , Disp: , Rfl:   •  cholecalciferol (VITAMIN D3) 1,000 units tablet, Take 2 tablets (2,000 Units total) by mouth daily for 7 days, Disp: 14 tablet, Rfl: 0  •  denosumab (PROLIA) 60 mg/mL, Inject 60 mg under the skin, Disp: , Rfl:   •  DULoxetine (CYMBALTA) 30 mg delayed release capsule, Take 30 mg by mouth daily, Disp: , Rfl:   •  DULoxetine (CYMBALTA) 60 mg delayed release capsule, Take 60 mg by mouth daily, Disp: , Rfl:   •  hydrocortisone (ANUSOL-HC) 2 5 % rectal cream, Apply topically 2 (two) times a day Apply rectally, insert into rectum, Disp: 30 g, Rfl: 1  •  Jardiance 25 MG TABS, Take 25 mg by mouth daily, Disp: , Rfl:   •  LORazepam (ATIVAN) 0 5 mg tablet, 1 PO OD PRN, Disp: , Rfl:   •  potassium chloride (MICRO-K) 10 MEQ CR capsule, TAKE 1 CAPSULE (10 MEQ TOTAL) BY MOUTH 2 (TWO) TIMES A DAY , Disp: , Rfl:   •  Rinvoq 15 MG TB24, , Disp: , Rfl:   •  valsartan-hydrochlorothiazide (DIOVAN-HCT) 160-12 5 MG per tablet, Take 1 tablet by mouth daily, Disp: , Rfl:   •  zolpidem (AMBIEN) 5 mg tablet, Take 5 mg by mouth daily at bedtime as needed for sleep , Disp: , Rfl:   •  Actemra ACTPen 162 MG/0 9ML SOAJ, , Disp: , Rfl:   •  Ascorbic Acid (vitamin C) 1000 MG tablet, Take 1 tablet (1,000 mg total) by mouth every 12 (twelve) hours for 7 days, Disp: 14 tablet, Rfl: 0  •  atorvastatin (LIPITOR) 20 mg tablet, Take 20 mg by mouth daily  (Patient not taking: Reported on 3/23/2022 ), Disp: , Rfl:   •  metFORMIN (GLUCOPHAGE) 1000 MG tablet, Take 1,000 mg by mouth 2 (two) times a day with meals (Patient not taking: Reported on 3/23/2022 ), Disp: , Rfl:   •  Multiple Vitamin (multivitamin) tablet, Take 1 tablet by mouth daily for 7 days, Disp: 7 tablet, Rfl: 0  •  predniSONE 10 mg tablet, Take 10 mg by mouth daily, Disp: , Rfl:   •  XELJANZ XR 11 MG TB24, Take by mouth, Disp: , Rfl:     Past Medical History:   Diagnosis Date   • Anxiety    • Arthritis    • Colon polyp    • Depression    • Diabetes mellitus ([facility] )     diet controlled   • Glaucoma    • Hyperlipidemia    • Hypertension    • Panic disorder    • RA (rheumatoid arthritis) ([facility] )        Past Surgical History:   Procedure Laterality Date   • BAND HEMORRHOIDECTOMY     • CHOLECYSTECTOMY     • MO COLONOSCOPY FLX DX W/COLLJ SPEC WHEN PFRMD N/A 3/6/2017    Procedure: COLONOSCOPY;  Surgeon: Julianna Chun MD;  [facility]; Service: Gastroenterology   • MO COLONOSCOPY FLX DX W/COLLJ SPEC WHEN PFRMD N/A 9/7/2016    Procedure: COLONOSCOPY;  Surgeon: Julianna Chun MD;  [facility];   Service: Gastroenterology   • MO COLONOSCOPY FLX DX W/COLLJ [facility] WHEN PFRMD N/A 6/28/2016    Procedure: COLONOSCOPY;  Surgeon: Justice Ham Genet Greene MD;  [facility]; Service: Gastroenterology   • TUBAL LIGATION         History reviewed  No pertinent family history  reports that she quit smoking about 28 years ago  She has never used smokeless tobacco  She reports that she does not drink alcohol and does not use drugs        Physical Exam:    /92 (BP Location: Right arm, Patient Position: Sitting, Cuff Size: Standard)   Pulse 95   Ht 5' 1\" (1 549 m)   Wt 73 7 kg (162 lb 6 4 oz)   SpO2 98%   BMI 30 69 kg/m²     Gen: wn/wd, NAD, healthy-appearing female  HEENT: anicteric, MMM, no cervical LAD  CVS: RRR, no m/r/g  CHEST: CTA b/l  ABD: +BS, soft, NT,ND, no hepatosplenomegaly  EXT: no c/c/e  NEURO: aaox3  SKIN: NO rashes      "

## 2023-04-26 NOTE — PATIENT INSTRUCTIONS
1 tablespoon of metamucil or citrucel or benefiber once daily  Use the anusol cream in your rectum at night 1-2 weeks    Extra 1/2 dose of miralalx prep 2 nights before colonosocpy  Scheduled date of colonoscopy (as of today):07/03/23  Physician performing colonoscopy:dr medrano  Location of colonoscopy:[facility]  Bowel prep reviewed with patient:miralax  dulcolax plus half dose of miralax 2 nights before  Instructions reviewed with patient by:DIABETIC  Clearances:

## 2023-07-03 ENCOUNTER — HOSPITAL ENCOUNTER (OUTPATIENT)
Dept: GASTROENTEROLOGY | Facility: AMBULARY SURGERY CENTER | Age: 68
Setting detail: OUTPATIENT SURGERY
Discharge: HOME | End: 2023-07-03
Attending: INTERNAL MEDICINE
Payer: MEDICARE

## 2023-07-03 ENCOUNTER — ANESTHESIA (OUTPATIENT)
Dept: GASTROENTEROLOGY | Facility: AMBULARY SURGERY CENTER | Age: 68
End: 2023-07-03

## 2023-07-03 ENCOUNTER — ANESTHESIA EVENT (OUTPATIENT)
Dept: GASTROENTEROLOGY | Facility: AMBULARY SURGERY CENTER | Age: 68
End: 2023-07-03

## 2023-07-03 VITALS
HEIGHT: 61 IN | OXYGEN SATURATION: 97 % | RESPIRATION RATE: 18 BRPM | SYSTOLIC BLOOD PRESSURE: 134 MMHG | BODY MASS INDEX: 29.68 KG/M2 | TEMPERATURE: 96.9 F | WEIGHT: 157.2 LBS | HEART RATE: 69 BPM | DIASTOLIC BLOOD PRESSURE: 64 MMHG

## 2023-07-03 DIAGNOSIS — D12.6 TUBULOVILLOUS ADENOMA POLYP OF COLON: ICD-10-CM

## 2023-07-03 DIAGNOSIS — R19.4 CHANGE IN BOWEL HABIT: ICD-10-CM

## 2023-07-03 PROCEDURE — 45380 COLONOSCOPY AND BIOPSY: CPT | Performed by: INTERNAL MEDICINE

## 2023-07-03 PROCEDURE — 88305 TISSUE EXAM BY PATHOLOGIST: CPT | Performed by: PATHOLOGY

## 2023-07-03 RX ORDER — SODIUM CHLORIDE, SODIUM LACTATE, POTASSIUM CHLORIDE, CALCIUM CHLORIDE 600; 310; 30; 20 MG/100ML; MG/100ML; MG/100ML; MG/100ML
125 INJECTION, SOLUTION INTRAVENOUS CONTINUOUS
Status: CANCELLED | OUTPATIENT
Start: 2023-07-03

## 2023-07-03 RX ORDER — HYDROCORTISONE ACETATE 25 MG/1
25 SUPPOSITORY RECTAL 2 TIMES DAILY
Qty: 12 SUPPOSITORY | Refills: 2 | Status: SHIPPED | OUTPATIENT
Start: 2023-07-03

## 2023-07-03 RX ORDER — LIDOCAINE HYDROCHLORIDE 10 MG/ML
INJECTION, SOLUTION EPIDURAL; INFILTRATION; INTRACAUDAL; PERINEURAL AS NEEDED
Status: DISCONTINUED | OUTPATIENT
Start: 2023-07-03 | End: 2023-07-03

## 2023-07-03 RX ORDER — PROPOFOL 10 MG/ML
INJECTION, EMULSION INTRAVENOUS AS NEEDED
Status: DISCONTINUED | OUTPATIENT
Start: 2023-07-03 | End: 2023-07-03

## 2023-07-03 RX ORDER — SODIUM CHLORIDE, SODIUM LACTATE, POTASSIUM CHLORIDE, CALCIUM CHLORIDE 600; 310; 30; 20 MG/100ML; MG/100ML; MG/100ML; MG/100ML
INJECTION, SOLUTION INTRAVENOUS CONTINUOUS PRN
Status: DISCONTINUED | OUTPATIENT
Start: 2023-07-03 | End: 2023-07-03

## 2023-07-03 RX ORDER — SODIUM CHLORIDE, SODIUM LACTATE, POTASSIUM CHLORIDE, CALCIUM CHLORIDE 600; 310; 30; 20 MG/100ML; MG/100ML; MG/100ML; MG/100ML
125 INJECTION, SOLUTION INTRAVENOUS CONTINUOUS
Status: DISCONTINUED | OUTPATIENT
Start: 2023-07-03 | End: 2023-07-07 | Stop reason: HOSPADM

## 2023-07-03 RX ADMIN — PROPOFOL 50 MG: 10 INJECTION, EMULSION INTRAVENOUS at 08:49

## 2023-07-03 RX ADMIN — LIDOCAINE HYDROCHLORIDE 50 MG: 10 INJECTION, SOLUTION EPIDURAL; INFILTRATION; INTRACAUDAL; PERINEURAL at 08:38

## 2023-07-03 RX ADMIN — PROPOFOL 50 MG: 10 INJECTION, EMULSION INTRAVENOUS at 08:53

## 2023-07-03 RX ADMIN — PROPOFOL 50 MG: 10 INJECTION, EMULSION INTRAVENOUS at 08:55

## 2023-07-03 RX ADMIN — PROPOFOL 50 MG: 10 INJECTION, EMULSION INTRAVENOUS at 08:46

## 2023-07-03 RX ADMIN — PROPOFOL 50 MG: 10 INJECTION, EMULSION INTRAVENOUS at 08:40

## 2023-07-03 RX ADMIN — SODIUM CHLORIDE, SODIUM LACTATE, POTASSIUM CHLORIDE, AND CALCIUM CHLORIDE: .6; .31; .03; .02 INJECTION, SOLUTION INTRAVENOUS at 08:38

## 2023-07-03 RX ADMIN — PROPOFOL 100 MG: 10 INJECTION, EMULSION INTRAVENOUS at 08:38

## 2023-07-03 RX ADMIN — PROPOFOL 50 MG: 10 INJECTION, EMULSION INTRAVENOUS at 08:43

## 2023-07-03 NOTE — ANESTHESIA POSTPROCEDURE EVALUATION
Post-Op Assessment Note    CV Status:  Stable    Pain management: adequate     Mental Status:  Alert and awake   Hydration Status:  Euvolemic   PONV Controlled:  Controlled   Airway Patency:  Patent      Post Op Vitals Reviewed: Yes      Staff: CRNA         No notable events documented.     /59 (07/03/23 0858)    Temp (!) 96.9 °F (36.1 °C) (07/03/23 0858)    Pulse 91 (07/03/23 0858)   Resp 16 (07/03/23 0858)    SpO2 94 % (07/03/23 0858)

## 2023-07-03 NOTE — H&P
History and Physical - [facility]  Dinah Hinkle 80 y.o. female MRN: [id number]    HPI: Dinah Hinkle is a 80y.o. year old female who presents with hx of colon polyps (TVA w HGD), change in BM. Review of Systems    Historical Information   Past Medical History:   Diagnosis Date   • Anxiety    • Arthritis    • Colon polyp    • Depression    • Diabetes mellitus ([address])     diet controlled   • Glaucoma    • Hyperlipidemia    • Hypertension    • Panic disorder    • RA (rheumatoid arthritis) ([address])      Past Surgical History:   Procedure Laterality Date   • BAND HEMORRHOIDECTOMY     • CHOLECYSTECTOMY     • HI COLONOSCOPY FLX DX W/COLLJ SPEC WHEN PFRMD N/A 3/6/2017    Procedure: COLONOSCOPY;  Surgeon: Manolo Vergara MD;  Location: [facility]; Service: Gastroenterology   • HI COLONOSCOPY FLX DX W/COLLJ SPEC WHEN PFRMD N/A 2016    Procedure: COLONOSCOPY;  Surgeon: Manolo Vergara MD;  Location: [facility]; Service: Gastroenterology   • HI COLONOSCOPY FLX DX W/COLLJ SPEC WHEN PFRMD N/A 2016    Procedure: COLONOSCOPY;  Surgeon: Manolo Vergara MD;  Location: [facility]; Service: Gastroenterology   • TUBAL LIGATION       Social History   Social History     Substance and Sexual Activity   Alcohol Use No     Social History     Substance and Sexual Activity   Drug Use No     Social History     Tobacco Use   Smoking Status Former   • Types: Cigarettes   • Quit date:    • Years since quittin.5   Smokeless Tobacco Never     No family history on file.     Meds/Allergies     (Not in a hospital admission)      Allergies   Allergen Reactions   • Lisinopril Cough       Objective     /79   Pulse 98   Resp 13   Ht 5' 1" (1.549 m)   Wt 71.3 kg (157 lb 3.2 oz)   SpO2 97%   BMI 29.70 kg/m²       PHYSICAL EXAM    Gen: NAD  CV: RRR  CHEST: Clear  ABD: soft, NT/ND  EXT: no edema  Neuro: AAO      ASSESSMENT/PLAN:  This is a 80y.o. year old female here for  hx of colon polyps (TVA w HGD), change in BM.        PLAN:   Procedure: colonoscopy

## 2023-07-03 NOTE — ANESTHESIA PREPROCEDURE EVALUATION
Procedure:  COLONOSCOPY    Relevant Problems   CARDIO   (+) Hyperlipidemia   (+) Hypertension   (+) Rheumatoid arteritis (HCC)      GI/HEPATIC   (+) Hep C w/o coma, chronic (HCC)      HEMATOLOGY   (+) Chronic ITP (idiopathic thrombocytopenia) (HCC)   (+) Mediterranean anemia      MUSCULOSKELETAL   (+) Rheumatoid arteritis (HCC)   (+) Rheumatoid arthritis of multiple sites with negative rheumatoid factor (HCC)      NEURO/PSYCH   (+) Anxiety   (+) Depression      Endocrine   (+) Diabetes mellitus ([address])      Last CBC unremarkable 4/23  Physical Exam    Airway    Mallampati score: II  TM Distance: >3 FB  Neck ROM: full     Dental   Comment: edentulous,     Cardiovascular  Cardiovascular exam normal    Pulmonary  Pulmonary exam normal     Other Findings  Portions of exam deferred due to low yield and/or unknown COVID status      Anesthesia Plan  ASA Score- 3     Anesthesia Type- IV sedation with anesthesia with ASA Monitors. Additional Monitors:   Airway Plan:           Plan Factors-Exercise tolerance (METS): >4 METS. Chart reviewed. Existing labs reviewed. Patient summary reviewed. Patient is not a current smoker. Induction- intravenous. Postoperative Plan-     Informed Consent- Anesthetic plan and risks discussed with patient. I personally reviewed this patient with the CRNA. Discussed and agreed on the Anesthesia Plan with the CRNA. Jason Calix

## 2023-07-06 ENCOUNTER — NURSE TRIAGE (OUTPATIENT)
Age: 68
End: 2023-07-06

## 2023-07-06 PROCEDURE — 88305 TISSUE EXAM BY PATHOLOGIST: CPT | Performed by: PATHOLOGY

## 2023-07-06 NOTE — TELEPHONE ENCOUNTER
----- Message from Pallavi Webster sent at 7/6/2023  4:24 PM EDT -----  Patient called wanting to go over biopsy results from her colonoscopy done on 07/03/2023 please review and reach out thank you

## 2025-05-13 ENCOUNTER — OFFICE VISIT (OUTPATIENT)
Dept: GASTROENTEROLOGY | Facility: AMBULARY SURGERY CENTER | Age: 70
End: 2025-05-13
Payer: MEDICARE

## 2025-05-13 VITALS
HEART RATE: 72 BPM | WEIGHT: 141.4 LBS | OXYGEN SATURATION: 100 % | BODY MASS INDEX: 26.7 KG/M2 | DIASTOLIC BLOOD PRESSURE: 86 MMHG | HEIGHT: 61 IN | SYSTOLIC BLOOD PRESSURE: 146 MMHG

## 2025-05-13 DIAGNOSIS — K59.01 SLOW TRANSIT CONSTIPATION: Primary | ICD-10-CM

## 2025-05-13 DIAGNOSIS — R15.1 FECAL SMEARING: ICD-10-CM

## 2025-05-13 PROCEDURE — 99213 OFFICE O/P EST LOW 20 MIN: CPT | Performed by: INTERNAL MEDICINE

## 2025-05-13 RX ORDER — HYDROCORTISONE 25 MG/G
CREAM TOPICAL 2 TIMES DAILY
Qty: 30 G | Refills: 2 | Status: SHIPPED | OUTPATIENT
Start: 2025-05-13

## 2025-05-13 NOTE — PROGRESS NOTES
Name: Suzanne Mtz      : 1955      MRN: [id number]  Encounter Provider: Pardeep Schulz MD  Encounter Date: 2025   Encounter department: [facility]  :  Robert 69-year-old female with continued fecal leakage, mild constipation, normal colonoscopy  Assessment & Plan  Slow transit constipation  -Will check an x-ray of abdomen to evaluate for any significant constipation  - Will make further recommendations with regards to bowel regimen pending results  Orders:    hydrocortisone (ANUSOL-HC) 2.5 % rectal cream; Apply topically 2 (two) times a day    XR abdomen 1 view kub; Future    Fecal smearing  -Strong suspect that this is due to hemorrhoidal leakage  - Recommended Anusol cream to be used both externally and internally on a regular basis, continue high-fiber diet  - Further recommendations based on her clinical course, she was instructed to contact us via [employer] with an update of her symptoms  Orders:    hydrocortisone (ANUSOL-HC) 2.5 % rectal cream; Apply topically 2 (two) times a day    XR abdomen 1 view kub; Future        History of Present Illness   HPI  Suzanne Mtz is a 69 y.o. female who presents for fecal leakage.  This is a pleasant 69-year-old female, we had seen her in the past for mild constipation and intermittent bouts of fecal leakage.  Colonoscopies have been unremarkable.  Since her last visit, patient notes that she has been taking laxative about once per week to help with her bowel movements and recently she developed worsening constipation went almost 2 or 3 weeks without a bowel movement.  She did increase her fiber intake including Jimmy seeds, quinoa and fiber and has had more regular bowel movements about 3-4 bowel movements per week.  Since going more regularly she notes that she has some leakage about 30 minutes to an hour after having a bowel movement.  She frequently uses a wet paper towel to wipe after bowel movements.  Denies any  "melena or rectal bleeding.    She tried to increase her activity recently, she has been walking.  She lost 30 pounds intentionally.    Denies any abdominal pain but she is concerned about the continued leak which.  In the past we had recommended Anusol suppositories due to known internal/external hemorrhoids however she never took these.      Review of Systems  Review of systems was negative except for pertinent positive mentioned in HPI         Objective   /86 (BP Location: Left arm, Patient Position: Sitting, Cuff Size: Standard)   Pulse 72   Ht 5' 1\" (1.549 m)   Wt 64.1 kg (141 lb 6.4 oz)   SpO2 100%   BMI 26.72 kg/m²      Physical Exam  GEN: WN/WD, no acute distress, healthy-appearing female  HEENT: anicteric, MMM, no cervical lymphadenopathy  CV: RRR, no m/r/g  CHEST: CTA b/l, no WRR  ABD: +BS, soft NT/ND, no hepatosplenomegaly  EXT: no C/C/E  NEURO: AAOx3  SKIN: no rashes      "

## 2025-05-13 NOTE — ASSESSMENT & PLAN NOTE
-Strong suspect that this is due to hemorrhoidal leakage  - Recommended Anusol cream to be used both externally and internally on a regular basis, continue high-fiber diet  - Further recommendations based on her clinical course, she was instructed to contact us via [employer] with an update of her symptoms  Orders:    hydrocortisone (ANUSOL-HC) 2.5 % rectal cream; Apply topically 2 (two) times a day    XR abdomen 1 view kub; Future

## 2025-05-13 NOTE — ASSESSMENT & PLAN NOTE
-Will check an x-ray of abdomen to evaluate for any significant constipation  - Will make further recommendations with regards to bowel regimen pending results  Orders:    hydrocortisone (ANUSOL-HC) 2.5 % rectal cream; Apply topically 2 (two) times a day    XR abdomen 1 view kub; Future

## 2025-05-13 NOTE — PATIENT INSTRUCTIONS
Use hemorrhoid cream externally in the morning and apply some internally before bed  Call or send a [employer] message with an updated in a few weeks  Wet wipes or water after BM

## 2025-05-19 ENCOUNTER — APPOINTMENT (OUTPATIENT)
Dept: RADIOLOGY | Facility: HOSPITAL | Age: 70
End: 2025-05-19
Payer: MEDICARE

## 2025-05-19 ENCOUNTER — HOSPITAL ENCOUNTER (OUTPATIENT)
Dept: RADIOLOGY | Facility: HOSPITAL | Age: 70
Discharge: HOME | End: 2025-05-19
Payer: MEDICARE

## 2025-05-19 DIAGNOSIS — R15.1 FECAL SMEARING: ICD-10-CM

## 2025-05-19 DIAGNOSIS — K59.01 SLOW TRANSIT CONSTIPATION: ICD-10-CM

## 2025-05-19 PROCEDURE — 74018 RADEX ABDOMEN 1 VIEW: CPT

## 2025-05-22 ENCOUNTER — RESULTS FOLLOW-UP (OUTPATIENT)
Dept: GASTROENTEROLOGY | Facility: AMBULARY SURGERY CENTER | Age: 70
End: 2025-05-22

## 2025-06-04 DIAGNOSIS — K59.09 CHRONIC CONSTIPATION: Primary | ICD-10-CM

## 2025-06-04 RX ORDER — LINACLOTIDE 145 UG/1
145 CAPSULE, GELATIN COATED ORAL DAILY
Qty: 30 CAPSULE | Refills: 3 | Status: SHIPPED | OUTPATIENT
Start: 2025-06-04

## 2025-07-01 NOTE — TELEPHONE ENCOUNTER
Patient called in to [facility] include 234 beds at the [facility] a 1 mth fup she is currently Counts include 234 beds at the [facility] for 12/20  for a follow she has been on med [employer] but she is still having issues with change in bowels

## (undated) DEVICE — MARKER SPOT EX  BOWEL TATTOO SYRINGE

## (undated) DEVICE — NEEDLE SCLERO INTERJECT 25G 240MM